# Patient Record
Sex: FEMALE | Race: WHITE | Employment: OTHER | URBAN - METROPOLITAN AREA
[De-identification: names, ages, dates, MRNs, and addresses within clinical notes are randomized per-mention and may not be internally consistent; named-entity substitution may affect disease eponyms.]

---

## 2017-04-27 ENCOUNTER — OFFICE VISIT (OUTPATIENT)
Dept: INTERNAL MEDICINE CLINIC | Age: 67
End: 2017-04-27

## 2017-04-27 VITALS
OXYGEN SATURATION: 97 % | HEART RATE: 67 BPM | TEMPERATURE: 98.5 F | WEIGHT: 139 LBS | SYSTOLIC BLOOD PRESSURE: 124 MMHG | RESPIRATION RATE: 12 BRPM | HEIGHT: 65 IN | BODY MASS INDEX: 23.16 KG/M2 | DIASTOLIC BLOOD PRESSURE: 76 MMHG

## 2017-04-27 DIAGNOSIS — H11.32 SUBCONJUNCTIVAL HEMORRHAGE OF LEFT EYE: Primary | ICD-10-CM

## 2017-04-27 NOTE — PATIENT INSTRUCTIONS
Subconjunctival Hemorrhage: Care Instructions  Your Care Instructions    Sometimes small blood vessels in the white of the eye can break, causing a red spot or speck. This is called a subconjunctival hemorrhage. The blood vessels may break when you sneeze, cough, vomit, strain, or bend over. Sometimes there is no clear cause. The blood may look alarming, especially if the spot is large. If there is no pain or vision change, there is usually no reason to worry, and the blood slowly will go away on its own in 2 to 3 weeks. Follow-up care is a key part of your treatment and safety. Be sure to make and go to all appointments, and call your doctor if you are having problems. Its also a good idea to know your test results and keep a list of the medicines you take. How can you care for yourself at home? · Watch for changes in your eye. It is normal for the red spot on your eyeball to change color as it heals. Just like a bruise on your skin, it may change from red to brown to purple to yellow. · Do not take aspirin or products that contain aspirin, which can increase bleeding. Use acetaminophen (Tylenol) if you need pain relief for another problem. · Do not take two or more pain medicines at the same time unless the doctor told you to. Many pain medicines have acetaminophen, which is Tylenol. Too much acetaminophen (Tylenol) can be harmful. When should you call for help? Call your doctor now or seek immediate medical care if:  · You see blood over the black part of your eye (pupil). · You have any changes or problems in your vision. · You have any pain in your eye. · You have any discharge from your eye. Watch closely for changes in your health, and be sure to contact your doctor if:  · Your eye color is not steadily returning to normal.  · The blood has not gone away after 2 to 3 weeks. · You develop bruising or bleeding elsewhere, such as the gums or the skin, or you have nosebleeds.   Where can you learn more? Go to http://samanta-andrew.info/. Enter V411 in the search box to learn more about \"Subconjunctival Hemorrhage: Care Instructions. \"  Current as of: May 23, 2016  Content Version: 11.2  © 8199-6891 cooala - your brands, [employer]. Care instructions adapted under license by [employer] (which disclaims liability or warranty for this information). If you have questions about a medical condition or this instruction, always ask your healthcare professional. [place] [address] any warranty or liability for your use of this information.

## 2017-04-27 NOTE — MR AVS SNAPSHOT
Visit Information Date & Time Provider Department Dept. Phone Encounter #  
 4/27/2017  9:00 AM Orlin Leal, [facility] [phone] [id number] Follow-up Instructions Return if symptoms worsen or fail to improve. Upcoming Health Maintenance Date Due Hepatitis C Screening 1950 DTaP/Tdap/Td series (1 - Tdap) 4/6/2007 PAP AKA CERVICAL CYTOLOGY 3/31/2017 GLAUCOMA SCREENING Q2Y 4/24/2017 Pneumococcal 65+ Low/Medium Risk (1 of 2 - PCV13) 4/24/2017 MEDICARE YEARLY EXAM 4/24/2017 BREAST CANCER SCRN MAMMOGRAM 8/2/2018 COLONOSCOPY 5/13/2021 Allergies as of 4/27/2017  Review Complete On: 4/27/2017 By: Orlin Leal MD  
  
 Severity Noted Reaction Type Reactions Benzocaine  02/16/2010    Rash Current Immunizations  Reviewed on 4/27/2017 Name Date Influenza Vaccine 11/1/2016 TD Vaccine 4/5/2007 Zoster Vaccine, Live 11/21/2014 Reviewed by Sumaya Hardin RN on 4/27/2017 at  9:02 AM  
You Were Diagnosed With   
  
 Codes Comments Subconjunctival hemorrhage of left eye    -  Primary ICD-10-CM: H11.32 
ICD-9-CM: 372.72 Vitals BP Pulse Temp Resp Height(growth percentile) Weight(growth percentile) 124/76 67 98.5 °F (36.9 °C) (Oral) 12 5' 5.2\" (1.656 m) 139 lb (63 kg) SpO2 BMI OB Status Smoking Status 97% 22.99 kg/m2 Postmenopausal Former Smoker BMI and BSA Data Body Mass Index Body Surface Area  
 22.99 kg/m 2 1.7 m 2 Preferred Pharmacy Pharmacy Name Phone [facility] [address], [address] [phone] Your Updated Medication List  
  
   
This list is accurate as of: 4/27/17  9:18 AM.  Always use your most recent med list.  
  
  
  
  
 aspirin delayed-release 81 mg tablet Take  by mouth daily. B COMPLETE PO Take  by mouth. CALCIUM 600 + D PO Take 2 Tabs by mouth daily. citalopram 20 mg tablet Commonly known as:  CELEXA  
TAKE TWO TABLETS BY MOUTH DAILY CLARITIN 10 mg tablet Generic drug:  loratadine Take 10 mg by mouth every morning. denosumab 60 mg/mL injection Commonly known as:  [place] Gault 60 mg by SubCUTAneous route. fluticasone 50 mcg/actuation nasal spray Commonly known as:  Kamilla Rocker 2 Sprays by Both Nostrils route once. RESTASIS 0.05 % ophthalmic emulsion Generic drug:  cycloSPORINE  
  
 THERAPEUTIC MULTIVIT/MINERAL 27-0.4 mg Tab Generic drug:  multivitamin,tx-iron-ca-min Take 1 Tab by mouth daily. VITAMIN D3 1,000 unit tablet Generic drug:  cholecalciferol Take  by mouth daily. Follow-up Instructions Return if symptoms worsen or fail to improve. Patient Instructions Subconjunctival Hemorrhage: Care Instructions Your Care Instructions Sometimes small blood vessels in the white of the eye can break, causing a red spot or speck. This is called a subconjunctival hemorrhage. The blood vessels may break when you sneeze, cough, vomit, strain, or bend over. Sometimes there is no clear cause. The blood may look alarming, especially if the spot is large. If there is no pain or vision change, there is usually no reason to worry, and the blood slowly will go away on its own in 2 to 3 weeks. Follow-up care is a key part of your treatment and safety. Be sure to make and go to all appointments, and call your doctor if you are having problems. Its also a good idea to know your test results and keep a list of the medicines you take. How can you care for yourself at home? · Watch for changes in your eye. It is normal for the red spot on your eyeball to change color as it heals. Just like a bruise on your skin, it may change from red to brown to purple to yellow. · Do not take aspirin or products that contain aspirin, which can increase bleeding. Use acetaminophen (Tylenol) if you need pain relief for another problem. · Do not take two or more pain medicines at the same time unless the doctor told you to. Many pain medicines have acetaminophen, which is Tylenol. Too much acetaminophen (Tylenol) can be harmful. When should you call for help? Call your doctor now or seek immediate medical care if: 
· You see blood over the black part of your eye (pupil). · You have any changes or problems in your vision. · You have any pain in your eye. · You have any discharge from your eye. Watch closely for changes in your health, and be sure to contact your doctor if: 
· Your eye color is not steadily returning to normal. 
· The blood has not gone away after 2 to 3 weeks. · You develop bruising or bleeding elsewhere, such as the gums or the skin, or you have nosebleeds. Where can you learn more? Go to http://samanta-andrew.info/. Enter W693 in the search box to learn more about \"Subconjunctival Hemorrhage: Care Instructions. \" Current as of: May 23, 2016 Content Version: 11.2 © 0684-0102 [employer]. Care instructions adapted under license by [employer] (which disclaims liability or warranty for this information). If you have questions about a medical condition or this instruction, always ask your healthcare professional. [address] any warranty or liability for your use of this information. Introducing [facility]! Azucena Dominguez introduces rPath patient portal. Now you can access parts of your medical record, email your doctor's office, and request medication refills online. 1. In your internet browser, go to [email or web address] 2. Click on the First Time User? Click Here link in the Sign In box. You will see the New Member Sign Up page. 3. Enter your rPath Access Code exactly as it appears below. You will not need to use this code after youve completed the sign-up process.  If you do not sign up before the expiration date, you must request a new code. · [employer] Access Code: [email or web address] Expires: 7/26/2017  8:55 AM 
 
4. Enter the last four digits of your Social Security Number (xxxx) and Date of Birth (mm/dd/yyyy) as indicated and click Submit. You will be taken to the next sign-up page. 5. Create a Or[employer] ID. This will be your [employer] login ID and cannot be changed, so think of one that is secure and easy to remember. 6. Create a [employer] password. You can change your password at any time. 7. Enter your Password Reset Question and Answer. This can be used at a later time if you forget your password. 8. Enter your e-mail address. You will receive e-mail notification when new information is available in [address] 9. Click Sign Up. You can now view and download portions of your medical record. 10. Click the Download Summary menu link to download a portable copy of your medical information. If you have questions, please visit the Frequently Asked Questions section of the [employer] website. Remember, [employer] is NOT to be used for urgent needs. For medical emergencies, dial 911. Now available from your iPhone and Android! Please provide this summary of care documentation to your next provider. Your primary care clinician is listed as SHITAL GATES. If you have any questions after today's visit, please call [phone].

## 2017-04-27 NOTE — PROGRESS NOTES
Fantasma Keyes is a 74 y.o. female who was seen in clinic today (4/27/2017). Assessment & Plan:  Reece Islas was seen today for red eye. Diagnoses and all orders for this visit:    Subconjunctival hemorrhage of left eye- new dx to me, chronic issue, I spent 15 minutes with the patient and >50% of the time was spent counseling on etiologies, treatment options, and red flags. Will cont w/ eye drops and will stop ASA & MVI. Reviewed likely due to these meds. If no changes needs to see eye specialist & consider labs (CBC and INR). Did review if resolves since has been recurrent would not recommend restarting ASA but can talk to PCP. Red flags and expectations were reviewed & discussed with the her. She verbalized understanding. Follow-up Disposition:  Return if symptoms worsen or fail to improve.       ----------------------------------------------------------------------    Subjective:  Patient presents for presents evaluation of hemorrhage. She has noticed the above symptoms in the left eye for 4 weeks. Onset was sudden. Symptoms have included erythema. Patient denies pain, blurred vision, foreign body sensation, visual field deficit, discharge, photophobia. There is a history of this in the past.  Has worn contact lenses. No h/o trauma. Is using Restasis as directed. Had been taking aspirin 81mg up until today. Prior to Admission medications    Medication Sig Start Date End Date Taking? Authorizing Provider   citalopram (CELEXA) 20 mg tablet TAKE TWO TABLETS BY MOUTH DAILY 12/27/16  Yes Jena Elder MD   fluticasone (FLONASE) 50 mcg/actuation nasal spray 2 Sprays by Both Nostrils route once. Yes Historical Provider   denosumab (PROLIA) 60 mg/mL injection 60 mg by SubCUTAneous route. Yes Historical Provider   aspirin delayed-release 81 mg tablet Take  by mouth daily.    Yes Historical Provider   RESTASIS 0.05 % ophthalmic emulsion  8/3/11  Yes Historical Provider   VITAMIN B COMPLEX (B COMPLETE PO) Take  by mouth. Yes Historical Provider   loratadine (CLARITIN) 10 mg tablet Take 10 mg by mouth every morning. 2/16/10  Yes Historical Provider   multivitamin,tx-iron-ca-min (THERAPEUTIC MULTIVIT/MINERAL) 27-0.4 mg Tab Take 1 Tab by mouth daily. Yes Historical Provider   cholecalciferol, vitamin d3, (VITAMIN D) 1,000 unit tablet Take  by mouth daily. Yes Historical Provider   CALCIUM CARBONATE/VITAMIN D3 (CALCIUM 600 + D PO) Take 2 Tabs by mouth daily. 2/16/10  Yes Historical Provider          Allergies   Allergen Reactions    Benzocaine Rash           Review of Systems   HENT: Negative for nosebleeds. Respiratory: Negative for hemoptysis. Gastrointestinal: Negative for blood in stool and melena. Genitourinary: Negative for hematuria. Endo/Heme/Allergies: Does not bruise/bleed easily. Objective:   Physical Exam   Eyes: EOM are normal. Pupils are equal, round, and reactive to light. Right conjunctiva is not injected. Right conjunctiva has no hemorrhage. Left conjunctiva is not injected. Left conjunctiva has a hemorrhage. Cardiovascular: Regular rhythm and normal heart sounds. No murmur heard. Pulmonary/Chest: Effort normal and breath sounds normal. She has no wheezes. She has no rales. Visit Vitals    /76    Pulse 67    Temp 98.5 °F (36.9 °C) (Oral)    Resp 12    Ht 5' 5.2\" (1.656 m)    Wt 139 lb (63 kg)    SpO2 97%    BMI 22.99 kg/m2         Disclaimer:  Advised her to call back or return to office if symptoms worsen/change/persist.  Discussed expected course/resolution/complications of diagnosis in detail with patient. Medication risks/benefits/costs/interactions/alternatives discussed with patient. She was given an after visit summary which includes diagnoses, current medications, & vitals. She expressed understanding with the diagnosis and plan.         Puja Martinez MD

## 2017-06-05 LAB — CREATININE, EXTERNAL: 0.8

## 2017-06-27 DIAGNOSIS — F34.1 DYSTHYMIC DISORDER: ICD-10-CM

## 2017-06-27 RX ORDER — CITALOPRAM 20 MG/1
TABLET, FILM COATED ORAL
Qty: 60 TAB | Refills: 4 | Status: SHIPPED | OUTPATIENT
Start: 2017-06-27 | End: 2017-11-15 | Stop reason: SDUPTHER

## 2017-06-27 NOTE — TELEPHONE ENCOUNTER
Orders Placed This Encounter    citalopram (CELEXA) 20 mg tablet     Sig: TAKE TWO TABLETS BY MOUTH DAILY     Dispense:  60 Tab     Refill:  4     The above medication refills were approved via verbal order by Dr. Monse Almazan.

## 2017-11-15 ENCOUNTER — OFFICE VISIT (OUTPATIENT)
Dept: INTERNAL MEDICINE CLINIC | Age: 67
End: 2017-11-15

## 2017-11-15 VITALS
TEMPERATURE: 98.8 F | HEIGHT: 65 IN | HEART RATE: 67 BPM | BODY MASS INDEX: 23.82 KG/M2 | WEIGHT: 143 LBS | DIASTOLIC BLOOD PRESSURE: 82 MMHG | RESPIRATION RATE: 16 BRPM | SYSTOLIC BLOOD PRESSURE: 145 MMHG

## 2017-11-15 DIAGNOSIS — F34.1 DYSTHYMIC DISORDER: ICD-10-CM

## 2017-11-15 DIAGNOSIS — J01.40 ACUTE NON-RECURRENT PANSINUSITIS: Primary | ICD-10-CM

## 2017-11-15 RX ORDER — CITALOPRAM 20 MG/1
TABLET, FILM COATED ORAL
Qty: 180 TAB | Refills: 6 | Status: SHIPPED | OUTPATIENT
Start: 2017-11-15 | End: 2018-12-02 | Stop reason: SDUPTHER

## 2017-11-15 RX ORDER — AMOXICILLIN AND CLAVULANATE POTASSIUM 875; 125 MG/1; MG/1
1 TABLET, FILM COATED ORAL EVERY 12 HOURS
Qty: 20 TAB | Refills: 0 | Status: SHIPPED | OUTPATIENT
Start: 2017-11-15 | End: 2017-11-25

## 2017-11-15 NOTE — PROGRESS NOTES
HISTORY OF PRESENT ILLNESS  Belinda Manley is a 74 y.o. female. URI    The history is provided by the patient. This is a new problem. Episode onset: 8 d. The problem has not changed since onset. There has been no fever. Associated symptoms include congestion, sore throat and cough. Pertinent negatives include no abdominal pain, no diarrhea, no nausea and no vomiting. Associated symptoms comments: Colored discharge. Treatments tried: routine, ibuprofen, mucinex. The treatment provided mild relief. Review of Systems   HENT: Positive for congestion and sore throat. Respiratory: Positive for cough and sputum production. Gastrointestinal: Negative for abdominal pain, diarrhea, nausea and vomiting. Physical Exam   Constitutional: No distress. HENT:   Right Ear: Tympanic membrane and ear canal normal.   Left Ear: Tympanic membrane and ear canal normal.   Nose: Right sinus exhibits no maxillary sinus tenderness and no frontal sinus tenderness. Left sinus exhibits no maxillary sinus tenderness and no frontal sinus tenderness. Mouth/Throat: Posterior oropharyngeal edema and posterior oropharyngeal erythema present. No oropharyngeal exudate. Eyes: Conjunctivae are normal. Right eye exhibits no discharge. Left eye exhibits no discharge. Neck: Neck supple. Cardiovascular: Normal rate and regular rhythm. Exam reveals no gallop and no friction rub. No murmur heard. Pulmonary/Chest: Effort normal and breath sounds normal.   Lymphadenopathy:     She has no cervical adenopathy. Nursing note and vitals reviewed. ASSESSMENT and PLAN  Diagnoses and all orders for this visit:    1. Acute non-recurrent pansinusitis  -     AMB POC RAPID STREP A- neg  -     amoxicillin-clavulanate (AUGMENTIN) 875-125 mg per tablet; Take 1 Tab by mouth every twelve (12) hours for 10 days.     2. Dysthymic disorder  -     citalopram (CELEXA) 20 mg tablet; TAKE TWO TABLETS BY MOUTH DAILY

## 2017-11-15 NOTE — MR AVS SNAPSHOT
Visit Information Date & Time Provider Department Dept. Phone Encounter #  
 11/15/2017 11:30 AM Dedrick Cobb, [address] [facility] [phone] [id number] Follow-up Instructions Return in about 3 months (around 2/15/2018) for cpe. Upcoming Health Maintenance Date Due Hepatitis C Screening 1950 DTaP/Tdap/Td series (1 - Tdap) 4/6/2007 Pneumococcal 65+ Low/Medium Risk (1 of 2 - PCV13) 4/24/2017 MEDICARE YEARLY EXAM 4/24/2017 GLAUCOMA SCREENING Q2Y 3/27/2019 BREAST CANCER SCRN MAMMOGRAM 9/26/2019 COLONOSCOPY 5/13/2021 Allergies as of 11/15/2017  Review Complete On: 11/15/2017 By: Dedrick Cobb MD  
  
 Severity Noted Reaction Type Reactions Benzocaine  02/16/2010    Rash  
 topical  
  
Current Immunizations  Reviewed on 4/27/2017 Name Date Influenza Vaccine 11/1/2016 TD Vaccine 4/5/2007 Zoster Vaccine, Live 11/21/2014 Not reviewed this visit You Were Diagnosed With   
  
 Codes Comments Acute non-recurrent pansinusitis    -  Primary ICD-10-CM: J01.40 ICD-9-CM: 461.8 Dysthymic disorder     ICD-10-CM: F34.1 ICD-9-CM: 300.4 Vitals BP Pulse Temp Resp Height(growth percentile) Weight(growth percentile) 145/82 (BP 1 Location: Right arm, BP Patient Position: Sitting) 67 98.8 °F (37.1 °C) (Oral) 16 5' 5.2\" (1.656 m) 143 lb (64.9 kg) BMI OB Status Smoking Status 23.65 kg/m2 Postmenopausal Former Smoker BMI and BSA Data Body Mass Index Body Surface Area  
 23.65 kg/m 2 1.73 m 2 Preferred Pharmacy Pharmacy Name Phone Savana Moraes [address] [facility], [address] [phone] Your Updated Medication List  
  
   
This list is accurate as of: 11/15/17 12:43 PM.  Always use your most recent med list.  
  
  
  
  
 amoxicillin-clavulanate 875-125 mg per tablet Commonly known as:  AUGMENTIN  
 Take 1 Tab by mouth every twelve (12) hours for 10 days. B COMPLETE PO Take  by mouth. CALCIUM 600 + D PO Take 2 Tabs by mouth daily. citalopram 20 mg tablet Commonly known as:  CELEXA  
TAKE TWO TABLETS BY MOUTH DAILY CLARITIN 10 mg tablet Generic drug:  loratadine Take 10 mg by mouth every morning. denosumab 60 mg/mL injection Commonly known as:  Romney Sportsman 60 mg by SubCUTAneous route. fluticasone 50 mcg/actuation nasal spray Commonly known as:  Lindsay Finely 2 Sprays by Both Nostrils route once. MUCINEX PO Take  by mouth. THERAPEUTIC MULTIVIT/MINERAL 27-0.4 mg Tab Generic drug:  multivitamin,tx-iron-ca-min Take 1 Tab by mouth daily. VITAMIN D3 1,000 unit tablet Generic drug:  cholecalciferol Take  by mouth daily. Prescriptions Sent to Pharmacy Refills  
 citalopram (CELEXA) 20 mg tablet 6 Sig: TAKE TWO TABLETS BY MOUTH DAILY Class: Normal  
 Pharmacy: [address] Ph #: [phone]  
 amoxicillin-clavulanate (AUGMENTIN) 875-125 mg per tablet 0 Sig: Take 1 Tab by mouth every twelve (12) hours for 10 days. Class: Normal  
 Pharmacy: [facility] [address] [place] [address], [address] [place] [address] Ph #: [phone] Route: Oral  
  
We Performed the Following AMB POC RAPID STREP A [29135 CPT(R)] Follow-up Instructions Return in about 3 months (around 2/15/2018) for cpe. Introducing [facility]! [facility] introduces Autobook Now patient portal. Now you can access parts of your medical record, email your doctor's office, and request medication refills online. 1. In your internet browser, go to [email or web address]/Avan[employer]i Bio 2. Click on the First Time User? Click Here link in the Sign In box. You will see the New Member Sign Up page. 3. Enter your Autobook Now Access Code exactly as it appears below.  You will not need to use this code after youve completed the sign-up process. If you do not sign up before the expiration date, you must request a new code. · [employer] Access Code: [email or web address] Expires: 2/13/2018 12:43 PM 
 
4. Enter the last four digits of your Social Security Number (xxxx) and Date of Birth (mm/dd/yyyy) as indicated and click Submit. You will be taken to the next sign-up page. 5. Create a Music Factory ID. This will be your [employer] login ID and cannot be changed, so think of one that is secure and easy to remember. 6. Create a [employer] password. You can change your password at any time. 7. Enter your Password Reset Question and Answer. This can be used at a later time if you forget your password. 8. Enter your e-mail address. You will receive e-mail notification when new information is available in [address] 9. Click Sign Up. You can now view and download portions of your medical record. 10. Click the Download Summary menu link to download a portable copy of your medical information. If you have questions, please visit the Frequently Asked Questions section of the [employer] website. Remember, [employer] is NOT to be used for urgent needs. For medical emergencies, dial 911. Now available from your iPhone and Android! Please provide this summary of care documentation to your next provider. Your primary care clinician is listed as SHITAL GATES. If you have any questions after today's visit, please call [phone].

## 2017-11-16 LAB
S PYO AG THROAT QL: NEGATIVE
VALID INTERNAL CONTROL?: YES

## 2017-12-12 ENCOUNTER — OFFICE VISIT (OUTPATIENT)
Dept: INTERNAL MEDICINE CLINIC | Age: 67
End: 2017-12-12

## 2017-12-12 VITALS
HEIGHT: 65 IN | RESPIRATION RATE: 16 BRPM | DIASTOLIC BLOOD PRESSURE: 80 MMHG | HEART RATE: 71 BPM | WEIGHT: 138 LBS | TEMPERATURE: 98.5 F | BODY MASS INDEX: 22.99 KG/M2 | SYSTOLIC BLOOD PRESSURE: 126 MMHG | OXYGEN SATURATION: 98 %

## 2017-12-12 DIAGNOSIS — L72.3 SEBACEOUS CYST: Primary | ICD-10-CM

## 2017-12-12 NOTE — PATIENT INSTRUCTIONS
Epidermoid Cyst: Care Instructions  Your Care Instructions  An epidermoid (say \"sl-fyz-FOV-aurelia\") cyst is a lump just under the skin. These cysts can form when a hair follicle becomes blocked. They are common in acne and may occur on the face, neck, back, and genitals. However, they can form anywhere on the body. These cysts are not cancer and do not lead to cancer. They tend not to hurt, but they can sometimes become swollen and painful. They also may break open (rupture) and cause scarring. These cysts sometimes do not cause problems and may not need treatment. If you have a cyst that is swollen and hurts, your doctor may inject it with a medicine to help it heal. But it is more likely that a painful cyst will need to be removed. Your doctor will give you a shot of numbing medicine and cut into the cyst to drain it or remove it. This makes the symptoms go away. Follow-up care is a key part of your treatment and safety. Be sure to make and go to all appointments, and call your doctor if you are having problems. It's also a good idea to know your test results and keep a list of the medicines you take. How can you care for yourself at home? · Do not squeeze the cyst or poke it with a needle to open it. This can cause swelling, redness, and infection. · Always have a doctor look at any new lumps you get to make sure that they are not serious. When should you call for help? Watch closely for changes in your health, and be sure to contact your doctor if:  ? · You have a fever, redness, or swelling after you get a shot of medicine in the cyst.   ? · You see or feel a new lump on your skin. Where can you learn more? Go to http://samanta-andrew.info/. Enter Z760 in the search box to learn more about \"Epidermoid Cyst: Care Instructions. \"  Current as of: October 13, 2016  Content Version: 11.4  © 9886-5972 [employer].  Care instructions adapted under license by [employer] (which disclaims liability or warranty for this information). If you have questions about a medical condition or this instruction, always ask your healthcare professional. [address] any warranty or liability for your use of this information.

## 2017-12-12 NOTE — PROGRESS NOTES
HISTORY OF PRESENT ILLNESS  Kriss Mchugh is a 74 y.o. female. Cyst   The history is provided by the patient (noted sq nodule posterior neck). This is a new problem. Episode onset: noted one wk  The problem occurs constantly. The problem has not changed since onset. Exacerbated by: pressure, palpation. She has tried nothing for the symptoms. Review of Systems   Constitutional: Negative for chills, fever and malaise/fatigue. Physical Exam   Constitutional: No distress. HENT:   Head:       Nursing note and vitals reviewed. ASSESSMENT and PLAN  Diagnoses and all orders for this visit:    1.  Sebaceous cyst  -     REFERRAL TO PLASTIC SURGERY, warned re s/s infection

## 2017-12-12 NOTE — MR AVS SNAPSHOT
Visit Information Date & Time Provider Department Dept. Phone Encounter #  
 12/12/2017  1:00 PM Charity Chandler, [address] [facility] [phone] [id number] Follow-up Instructions Return if symptoms worsen or fail to improve. Your Appointments 2/7/2018  2:00 PM  
Medicare Physical with Charity Chandler MD  
[facility] [address]) Appt Note: a  
 [address] [facility] [address] [place] [address]  
[address] [place] [address] Upcoming Health Maintenance Date Due Hepatitis C Screening 1950 DTaP/Tdap/Td series (1 - Tdap) 4/6/2007 Pneumococcal 65+ Low/Medium Risk (1 of 2 - PCV13) 4/24/2017 MEDICARE YEARLY EXAM 4/24/2017 GLAUCOMA SCREENING Q2Y 3/27/2019 COLONOSCOPY 5/13/2021 Allergies as of 12/12/2017  Review Complete On: 12/12/2017 By: Charity Chandler MD  
  
 Severity Noted Reaction Type Reactions Benzocaine  02/16/2010    Rash  
 topical  
  
Current Immunizations  Reviewed on 4/27/2017 Name Date Influenza Vaccine 11/1/2016 TD Vaccine 4/5/2007 Zoster Vaccine, Live 11/21/2014 Not reviewed this visit You Were Diagnosed With   
  
 Codes Comments Sebaceous cyst    -  Primary ICD-10-CM: L72.3 ICD-9-CM: 706. 2 Vitals BP Pulse Temp Resp Height(growth percentile) Weight(growth percentile) 126/80 (BP 1 Location: Right arm, BP Patient Position: Sitting) 71 98.5 °F (36.9 °C) (Oral) 16 5' 5.2\" (1.656 m) 138 lb (62.6 kg) SpO2 BMI OB Status Smoking Status 98% 22.82 kg/m2 Postmenopausal Former Smoker BMI and BSA Data Body Mass Index Body Surface Area  
 22.82 kg/m 2 1.7 m 2 Preferred Pharmacy Pharmacy Name Phone  [facility] [address], [address] [phone] Your Updated Medication List  
  
   
 This list is accurate as of: 12/12/17  1:33 PM.  Always use your most recent med list.  
  
  
  
  
 B COMPLETE PO Take  by mouth. CALCIUM 600 + D PO Take 2 Tabs by mouth daily. citalopram 20 mg tablet Commonly known as:  CELEXA  
TAKE TWO TABLETS BY MOUTH DAILY CLARITIN 10 mg tablet Generic drug:  loratadine Take 10 mg by mouth every morning. denosumab 60 mg/mL injection Commonly known as:  Yvonna Dave 60 mg by SubCUTAneous route. fluticasone 50 mcg/actuation nasal spray Commonly known as:  Caffie Euler 2 Sprays by Both Nostrils route once. THERAPEUTIC MULTIVIT/MINERAL 27-0.4 mg Tab Generic drug:  multivitamin,tx-iron-ca-min Take 1 Tab by mouth daily. VITAMIN D3 1,000 unit tablet Generic drug:  cholecalciferol Take  by mouth daily. We Performed the Following REFERRAL TO PLASTIC SURGERY [REF89 Custom] Follow-up Instructions Return if symptoms worsen or fail to improve. Referral Information Referral ID Referred By Referred To  
  
 [id number] Tom GATES MD   
   [address] [place] [address], [address] [place] [address] Phone: [phone] Fax: [phone] Visits Status Start Date End Date 1 New Request 12/12/17 12/12/18 If your referral has a status of pending review or denied, additional information will be sent to support the outcome of this decision. Patient Instructions Epidermoid Cyst: Care Instructions Your Care Instructions An epidermoid (say \"us-zrw-KMW-moyd\") cyst is a lump just under the skin. These cysts can form when a hair follicle becomes blocked. They are common in acne and may occur on the face, neck, back, and genitals. However, they can form anywhere on the body. These cysts are not cancer and do not lead to cancer. They tend not to hurt, but they can sometimes become swollen and painful. They also may break open (rupture) and cause scarring. These cysts sometimes do not cause problems and may not need treatment. If you have a cyst that is swollen and hurts, your doctor may inject it with a medicine to help it heal. But it is more likely that a painful cyst will need to be removed. Your doctor will give you a shot of numbing medicine and cut into the cyst to drain it or remove it. This makes the symptoms go away. Follow-up care is a key part of your treatment and safety. Be sure to make and go to all appointments, and call your doctor if you are having problems. It's also a good idea to know your test results and keep a list of the medicines you take. How can you care for yourself at home? · Do not squeeze the cyst or poke it with a needle to open it. This can cause swelling, redness, and infection. · Always have a doctor look at any new lumps you get to make sure that they are not serious. When should you call for help? Watch closely for changes in your health, and be sure to contact your doctor if: 
? · You have a fever, redness, or swelling after you get a shot of medicine in the cyst.  
? · You see or feel a new lump on your skin. Where can you learn more? Go to http://samanta-andrew.info/. Enter K148 in the search box to learn more about \"Epidermoid Cyst: Care Instructions. \" Current as of: October 13, 2016 Content Version: 11.4 © 3417-4837 FastFig. Care instructions adapted under license by [employer] (which disclaims liability or warranty for this information). If you have questions about a medical condition or this instruction, always ask your healthcare professional. [address] any warranty or liability for your use of this information. Introducing [facility] & HEALTH SERVICES! Efra Kaur introduces [employer] patient portal. Now you can access parts of your medical record, email your doctor's office, and request medication refills online. 1. In your internet browser, go to [email or web address] 2. Click on the First Time User? Click Here link in the Sign In box. You will see the New Member Sign Up page. 3. Enter your GenoLogics Access Code exactly as it appears below. You will not need to use this code after youve completed the sign-up process. If you do not sign up before the expiration date, you must request a new code. · GenoLogics Access Code: [email or web address] Expires: 2/13/2018 12:43 PM 
 
4. Enter the last four digits of your Social Security Number (xxxx) and Date of Birth (mm/dd/yyyy) as indicated and click Submit. You will be taken to the next sign-up page. 5. Create a [employer] ID. This will be your GenoLogics login ID and cannot be changed, so think of one that is secure and easy to remember. 6. Create a GenoLogics password. You can change your password at any time. 7. Enter your Password Reset Question and Answer. This can be used at a later time if you forget your password. 8. Enter your e-mail address. You will receive e-mail notification when new information is available in [address] 9. Click Sign Up. You can now view and download portions of your medical record. 10. Click the Download Summary menu link to download a portable copy of your medical information. If you have questions, please visit the Frequently Asked Questions section of the GenoLogics website. Remember, GenoLogics is NOT to be used for urgent needs. For medical emergencies, dial 911. Now available from your iPhone and Android! Please provide this summary of care documentation to your next provider. Your primary care clinician is listed as SHITAL GATES. If you have any questions after today's visit, please call [phone].

## 2017-12-13 LAB — CREATININE, EXTERNAL: 0.7

## 2018-02-07 ENCOUNTER — OFFICE VISIT (OUTPATIENT)
Dept: INTERNAL MEDICINE CLINIC | Age: 68
End: 2018-02-07

## 2018-02-07 VITALS
HEIGHT: 65 IN | HEART RATE: 71 BPM | DIASTOLIC BLOOD PRESSURE: 76 MMHG | SYSTOLIC BLOOD PRESSURE: 129 MMHG | TEMPERATURE: 98.2 F | RESPIRATION RATE: 18 BRPM | OXYGEN SATURATION: 95 % | WEIGHT: 136.4 LBS | BODY MASS INDEX: 22.73 KG/M2

## 2018-02-07 DIAGNOSIS — M94.9 DISORDER OF BONE AND CARTILAGE: ICD-10-CM

## 2018-02-07 DIAGNOSIS — Z23 ENCOUNTER FOR IMMUNIZATION: ICD-10-CM

## 2018-02-07 DIAGNOSIS — Z13.31 SCREENING FOR DEPRESSION: ICD-10-CM

## 2018-02-07 DIAGNOSIS — E78.00 ELEVATED LDL CHOLESTEROL LEVEL: ICD-10-CM

## 2018-02-07 DIAGNOSIS — Z71.89 ADVANCE DIRECTIVE DISCUSSED WITH PATIENT: ICD-10-CM

## 2018-02-07 DIAGNOSIS — F34.1 DYSTHYMIC DISORDER: ICD-10-CM

## 2018-02-07 DIAGNOSIS — F32.A MILD DEPRESSION: ICD-10-CM

## 2018-02-07 DIAGNOSIS — Z00.00 WELCOME TO MEDICARE PREVENTIVE VISIT: Primary | ICD-10-CM

## 2018-02-07 DIAGNOSIS — Z11.59 NEED FOR HEPATITIS C SCREENING TEST: ICD-10-CM

## 2018-02-07 DIAGNOSIS — Z23 NEED FOR TDAP VACCINATION: ICD-10-CM

## 2018-02-07 DIAGNOSIS — M89.9 DISORDER OF BONE AND CARTILAGE: ICD-10-CM

## 2018-02-07 NOTE — MR AVS SNAPSHOT
[address] [place] [address] 
[phone] Patient: Krystina Kee MRN:  [id number] Visit Information Date & Time Provider Department Dept. Phone Encounter #  
 2/7/2018  2:00 PM Mis Abernathy, [address] [facility] [phone] [id number] Follow-up Instructions Return in about 1 year (around 2/7/2019), or if symptoms worsen or fail to improve. Upcoming Health Maintenance Date Due Hepatitis C Screening 1950 DTaP/Tdap/Td series (1 - Tdap) 4/6/2007 Pneumococcal 65+ Low/Medium Risk (1 of 2 - PCV13) 4/24/2017 MEDICARE YEARLY EXAM 2/8/2019 GLAUCOMA SCREENING Q2Y 3/27/2019 BREAST CANCER SCRN MAMMOGRAM 9/26/2019 COLONOSCOPY 5/13/2021 Allergies as of 2/7/2018  Review Complete On: 2/7/2018 By: Mis Abernathy MD  
  
 Severity Noted Reaction Type Reactions Benzocaine  02/16/2010    Rash  
 topical  
  
Current Immunizations  Reviewed on 4/27/2017 Name Date Influenza Vaccine 11/1/2016 TD Vaccine 4/5/2007 Zoster Vaccine, Live 11/21/2014 Not reviewed this visit You Were Diagnosed With   
  
 Codes Comments Welcome to Medicare preventive visit    -  Primary ICD-10-CM: Z00.00 ICD-9-CM: V70.0 Need for Tdap vaccination     ICD-10-CM: W79 ICD-9-CM: V06.1 Encounter for immunization     ICD-10-CM: S64 ICD-9-CM: V03.89 Need for hepatitis C screening test     ICD-10-CM: Z11.59 
ICD-9-CM: V73.89 Advance directive discussed with patient     ICD-10-CM: Z71.89 ICD-9-CM: V65.49 Screening for depression     ICD-10-CM: Z13.89 ICD-9-CM: V79.0 Dysthymic disorder     ICD-10-CM: F34.1 ICD-9-CM: 300.4 Elevated LDL cholesterol level     ICD-10-CM: E78.00 ICD-9-CM: 272.0 Disorder of bone and cartilage     ICD-10-CM: M89.9, M94.9 ICD-9-CM: 733.90 Vitals BP Pulse Temp Resp Height(growth percentile) Weight(growth percentile) 129/76 (BP 1 Location: Right arm, BP Patient Position: Sitting) 71 98.2 °F (36.8 °C) (Oral) 18 5' 5\" (1.651 m) 136 lb 6.4 oz (61.9 kg) SpO2 BMI OB Status Smoking Status 95% 22.7 kg/m2 Postmenopausal Former Smoker BMI and BSA Data Body Mass Index Body Surface Area 22.7 kg/m 2 1.68 m 2 Preferred Pharmacy Pharmacy Name Phone [facility] [address] [place] [address], [address] [place] [address] [phone] Your Updated Medication List  
  
   
This list is accurate as of: 18  3:02 PM.  Always use your most recent med list.  
  
  
  
  
 B COMPLETE PO Take  by mouth. CALCIUM 600 + D PO Take 2 Tabs by mouth daily. citalopram 20 mg tablet Commonly known as:  CELEXA  
TAKE TWO TABLETS BY MOUTH DAILY CLARITIN 10 mg tablet Generic drug:  loratadine Take 10 mg by mouth every morning. denosumab 60 mg/mL injection Commonly known as:  Crystal Bundy 60 mg by SubCUTAneous route. diph,Pertuss(Acell),Tet Vac-PF 2 Lf-(2.5-5-3-5 mcg)-5Lf/0.5 mL susp Commonly known as:  ADACEL  
0.5 mL by IntraMUSCular route once for 1 dose. fluticasone 50 mcg/actuation nasal spray Commonly known as:  Monisha Huitron 2 Sprays by Both Nostrils route once. pneumococcal 13 christian conj dip 0.5 mL Syrg injection Commonly known as:  PREVNAR-13  
0.5 mL by IntraMUSCular route once for 1 dose. THERAPEUTIC MULTIVIT/MINERAL 27-0.4 mg Tab Generic drug:  multivitamin,tx-iron-ca-min Take 1 Tab by mouth daily. VITAMIN D3 1,000 unit tablet Generic drug:  cholecalciferol Take  by mouth daily. Prescriptions Printed Refills diph,Pertuss,Acell,,Tet Vac-PF (ADACEL) 2 Lf-(2.5-5-3-5 mcg)-5Lf/0.5 mL susp 0 Si.5 mL by IntraMUSCular route once for 1 dose. Class: Print  Route: IntraMUSCular  
 pneumococcal 13 christian conj dip (PREVNAR-13) 0.5 mL syrg injection 0  
 Si.5 mL by IntraMUSCular route once for 1 dose. Class: Print Route: IntraMUSCular We Performed the Following AMB POC EKG ROUTINE W/ 12 LEADS, SCREEN () [ [facility]] CBC WITH AUTOMATED DIFF [31048 CPT(R)] Christiannef 68 [[id number] [facility]S] HEPATITIS C AB [67485 CPT(R)] LIPID PANEL [46192 CPT(R)] METABOLIC PANEL, COMPREHENSIVE [65902 CPT(R)] TSH 3RD GENERATION [91887 CPT(R)] URINALYSIS W/ RFLX MICROSCOPIC [23154 CPT(R)] Follow-up Instructions Return in about 1 year (around 2019), or if symptoms worsen or fail to improve. Patient Instructions Medicare Wellness Visit, Female The best way to live healthy is to have a healthy lifestyle by eating a well-balanced diet, exercising regularly, limiting alcohol and stopping smoking. Regular physical exams and screening tests are another way to keep healthy. Preventive exams provided by your health care provider can find health problems before they become diseases or illnesses. Preventive services including immunizations, screening tests, monitoring and exams can help you take care of your own health. All people over age 72 should have a pneumovax  and and a prevnar shot to prevent pneumonia. These are once in a lifetime unless you and your provider decide differently. All people over 65 should have a yearly flu shot and a tetanus vaccine every 10 years. A bone mass density to screen for osteoporosis or thinning of the bones should be done every 2 years after 65. Screening for diabetes mellitus with a blood sugar test should be done every year. Glaucoma is a disease of the eye due to increased ocular pressure that can lead to blindness and it should be done every year by an eye professional. 
 
Cardiovascular screening tests that check for elevated lipids (fatty part of blood) which can lead to heart disease and strokes should be done every 5 years. Colorectal screening that evaluates for blood or polyps in your colon should be done yearly as a stool test or every five years as a flexible sigmoidoscope or every 10 years as a colonoscopy up to age 76. Breast cancer screening with a mammogram is recommended biennially  for women age 54-69. Screening for cervical cancer with a pap smear and pelvic exam is recommended for women after age 72 years every 2 years up to age 79 or when the provider and patient decide to stop. If there is a history of cervical abnormalities or other increased risk for cancer then the test is recommended yearly. Hepatitis C screening is also recommended for anyone born between [birth year] through [address]. A shingles vaccine is also recommended once in a lifetime after age 61. Your Medicare Wellness Exam is recommended annually. Here is a list of your current Health Maintenance items with a due date: 
Health Maintenance Due Topic Date Due  
 Hepatitis C Test  1950  DTaP/Tdap/Td  (1 - Tdap) 04/06/2007  Pneumococcal Vaccine (1 of 2 - PCV13) 04/24/2017 Introducing \[facility]\"" & [facility] SERVICES! [facility] introduces [employer] patient portal. Now you can access parts of your medical record, email your doctor's office, and request medication refills online. 1. In your internet browser, go to [email or web address] 2. Click on the First Time User? Click Here link in the Sign In box. You will see the New Member Sign Up page. 3. Enter your Scot[employer] Gear Access Code exactly as it appears below. You will not need to use this code after youve completed the sign-up process. If you do not sign up before the expiration date, you must request a new code. · [employer] Access Code: [email or web address] Expires: 2/13/2018 12:43 PM 
 
4. Enter the last four digits of your Social Security Number (xxxx) and Date of Birth (mm/dd/yyyy) as indicated and click Submit. You will be taken to the next sign-up page. 5. Create a Sien ID. This will be your Sien login ID and cannot be changed, so think of one that is secure and easy to remember. 6. Create a Sien password. You can change your password at any time. 7. Enter your Password Reset Question and Answer. This can be used at a later time if you forget your password. 8. Enter your e-mail address. You will receive e-mail notification when new information is available in [address] 9. Click Sign Up. You can now view and download portions of your medical record. 10. Click the Download Summary menu link to download a portable copy of your medical information. If you have questions, please visit the Frequently Asked Questions section of the Sien website. Remember, Sien is NOT to be used for urgent needs. For medical emergencies, dial 911. Now available from your iPhone and Android! Please provide this summary of care documentation to your next provider. Your primary care clinician is listed as SHITAL GATES. If you have any questions after today's visit, please call [phone].

## 2018-02-07 NOTE — PROGRESS NOTES
This is a \"Welcome to [employer]"  Initial Preventive Physical Examination (IPPE) providing Personalized Prevention Plan Services (Performed in the first 12 months of enrollment)    I have reviewed the patient's medical history in detail and updated the computerized patient record. History   History reviewed. No pertinent past medical history. Past Surgical History:   Procedure Laterality Date    ENDOSCOPY, COLON, DIAGNOSTIC      11, due 16     Current Outpatient Prescriptions   Medication Sig Dispense Refill    citalopram (CELEXA) 20 mg tablet TAKE TWO TABLETS BY MOUTH DAILY 180 Tab 6    fluticasone (FLONASE) 50 mcg/actuation nasal spray 2 Sprays by Both Nostrils route once.  denosumab (PROLIA) 60 mg/mL injection 60 mg by SubCUTAneous route.  VITAMIN B COMPLEX (B COMPLETE PO) Take  by mouth.  loratadine (CLARITIN) 10 mg tablet Take 10 mg by mouth every morning.  multivitamin,tx-iron-ca-min (THERAPEUTIC MULTIVIT/MINERAL) 27-0.4 mg Tab Take 1 Tab by mouth daily.  cholecalciferol, vitamin d3, (VITAMIN D) 1,000 unit tablet Take  by mouth daily.  CALCIUM CARBONATE/VITAMIN D3 (CALCIUM 600 + D PO) Take 2 Tabs by mouth daily. Allergies   Allergen Reactions    Benzocaine Rash     topical     Family History   Problem Relation Age of Onset    Hypertension Mother     Cancer Mother      breast, colon    Asthma Father     Heart Disease Father     Arthritis-rheumatoid Brother     Hypertension Brother     Other Brother      rare blood disorder     Social History   Substance Use Topics    Smoking status: Former Smoker     Quit date: 1/1/1999    Smokeless tobacco: Never Used    Alcohol use 3.0 oz/week     3 Standard drinks or equivalent, 2 Glasses of wine per week     Diet, Lifestyle: No special diet    Exercise level: moderately active    Depression Risk Screen   No flowsheet data found. Alcohol Risk Screen   You do not drink alcohol or very rarely.     Functional Ability and Level of Safety   Hearing Loss  Hearing is good. Vision Screening  Vision is good. No exam data present      Activities of Daily Living  The home contains: no safety equipment. Patient does total self care    Fall Risk Screen  Fall Risk Assessment, last 12 mths 2/7/2018   Able to walk? Yes   Fall in past 12 months? No       Abuse Screen  Patient is not abused    Screening EKG   EKG order placed: Yes    Patient Care Team   Patient Care Team:  Blanka Tomas MD as PCP - General     End of Life Planning   Advanced care planning directives were discussed with the patient and /or family/caregiver. Assessment/Plan   Education and counseling provided:  Are appropriate based on today's review and evaluation    Diagnoses and all orders for this visit:    1. Welcome to Medicare preventive visit  -     TSH 3RD GENERATION  -     METABOLIC PANEL, COMPREHENSIVE  -     CBC WITH AUTOMATED DIFF  -     HEMOGLOBIN A1C WITH EAG  -     LIPID PANEL  -     URINALYSIS W/ RFLX MICROSCOPIC  -     AMB POC EKG Screen ([id number]) (Only Orderable in first 12 months of Medicare)    2. Need for Tdap vaccination  -     diph,Pertuss,Acell,,Tet Vac-PF (ADACEL) 2 Lf-(2.5-5-3-5 mcg)-5Lf/0.5 mL susp; 0.5 mL by IntraMUSCular route once for 1 dose. 3. Encounter for immunization  -     PNEUMOCOCCAL POLYSACCHARIDE VACCINE, 23-VALENT, ADULT OR IMMUNOSUPPRESSED PT DOSE,  -     pneumococcal 13 christian conj dip (PREVNAR-13) 0.5 mL syrg injection; 0.5 mL by IntraMUSCular route once for 1 dose. 4. Need for hepatitis C screening test  -     HEPATITIS C AB    5. Advance directive discussed with patient    6.  Screening for depression  -     Depression Screen Annual        Health Maintenance Due   Topic Date Due    Hepatitis C Screening  1950    DTaP/Tdap/Td series (1 - Tdap) 04/06/2007    Pneumococcal 65+ Low/Medium Risk (1 of 2 - PCV13) 04/24/2017

## 2018-02-07 NOTE — PROGRESS NOTES
HISTORY OF PRESENT ILLNESS  Nupur Osuna is a 74 y.o. female. TORRI Santos is seen today for a Welcome to Medicare visit, complete physical examination and follow up of chronic problems. Preventive medicine. Fully reviewed today. She is due for a complete physical examination and routine screening laboratory studies. Also, due for EKG, Tdap booster and Prevnar vaccination. She is up to date with mammogram, colonoscopy, bone density study and Pap smear in 2016. Stool testing for occult blood not indicated. For Medicare Wellness Visit, see attached note. Chronic medical problems are reviewed. Osteoporosis. Stable on Prolia. Mild depression. Stable, no significant worsening. She has handled the death of her  well. Review of systems notable for some pain in the right upper buttock. See physical exam.    MedDATA/gwo     Addendum- left upper buttock    Review of Systems   Constitutional: Negative for weight loss. Respiratory: Negative. Cardiovascular: Negative for chest pain, palpitations, leg swelling and PND. Gastrointestinal: Negative. Genitourinary: Negative. Musculoskeletal: Positive for back pain. Negative for myalgias. Neurological: Negative for focal weakness. Physical Exam   Constitutional: She is oriented to person, place, and time. She appears well-developed and well-nourished. No distress. HENT:   Head: Normocephalic and atraumatic. Right Ear: Tympanic membrane, external ear and ear canal normal.   Left Ear: Tympanic membrane, external ear and ear canal normal.   Eyes: EOM are normal. Pupils are equal, round, and reactive to light. Right eye exhibits no discharge. Left eye exhibits no discharge. Neck: Normal range of motion. Neck supple. Carotid bruit is not present. No thyromegaly present. Cardiovascular: Normal rate, regular rhythm, normal heart sounds and intact distal pulses. Exam reveals no gallop and no friction rub.     No murmur heard.  Pulmonary/Chest: Effort normal and breath sounds normal. No respiratory distress. She has no wheezes. She has no rales. Right breast exhibits no inverted nipple, no mass, no nipple discharge, no skin change and no tenderness. Left breast exhibits no inverted nipple, no mass, no nipple discharge, no skin change and no tenderness. Breasts are symmetrical.   Breast and back exam performed with JESSICA Johnson   Abdominal: Soft. Bowel sounds are normal. She exhibits no distension and no mass. There is no tenderness. There is no rebound and no guarding. Musculoskeletal: Normal range of motion. She exhibits no edema or tenderness. Legs:  Lymphadenopathy:     She has no cervical adenopathy. Neurological: She is alert and oriented to person, place, and time. She has normal reflexes. Skin: Skin is warm and dry. No rash noted. Psychiatric: She has a normal mood and affect. Her behavior is normal.   Nursing note and vitals reviewed. ASSESSMENT and PLAN  Diagnoses and all orders for this visit:    1. Welcome to Medicare preventive visit  -     AMB POC EKG Screen ([id number]) (Only Orderable in first 12 months of Medicare)    2. Need for Tdap vaccination  -     diph,Pertuss,Acell,,Tet Vac-PF (ADACEL) 2 Lf-(2.5-5-3-5 mcg)-5Lf/0.5 mL susp; 0.5 mL by IntraMUSCular route once for 1 dose. 3. Encounter for immunization  -     pneumococcal 13 christian conj dip (PREVNAR-13) 0.5 mL syrg injection; 0.5 mL by IntraMUSCular route once for 1 dose. 4. Need for hepatitis C screening test  -     HEPATITIS C AB    5. Advance directive discussed with patient    6. Screening for depression  -     Depression Screen Annual    7. Dysthymic disorder- Continue current regimen of prescription and / or OTC medications     8. Elevated LDL cholesterol level  -     TSH 3RD GENERATION  -     METABOLIC PANEL, COMPREHENSIVE  -     CBC WITH AUTOMATED DIFF  -     LIPID PANEL    9.  Disorder of bone and cartilage  -     URINALYSIS W/ RFLX MICROSCOPIC    10.  Mild depression ([facility] [address]- Continue current regimen of prescription and / or OTC medications

## 2018-02-07 NOTE — PATIENT INSTRUCTIONS

## 2018-02-09 ENCOUNTER — HOSPITAL ENCOUNTER (OUTPATIENT)
Dept: LAB | Age: 68
Discharge: HOME | End: 2018-02-09
Payer: MEDICARE

## 2018-02-09 PROBLEM — F32.A MILD DEPRESSION: Status: ACTIVE | Noted: 2018-02-09

## 2018-02-09 PROCEDURE — 84443 ASSAY THYROID STIM HORMONE: CPT

## 2018-02-09 PROCEDURE — 81003 URINALYSIS AUTO W/O SCOPE: CPT

## 2018-02-09 PROCEDURE — 86803 HEPATITIS C AB TEST: CPT

## 2018-02-09 PROCEDURE — 36415 COLL VENOUS BLD VENIPUNCTURE: CPT

## 2018-02-09 PROCEDURE — 80061 LIPID PANEL: CPT

## 2018-02-09 PROCEDURE — 80053 COMPREHEN METABOLIC PANEL: CPT

## 2018-02-09 PROCEDURE — 85025 COMPLETE CBC W/AUTO DIFF WBC: CPT

## 2018-02-10 LAB
ALBUMIN SERPL-MCNC: 4.2 G/DL (ref 3.6–4.8)
ALBUMIN/GLOB SERPL: 2.1 {RATIO} (ref 1.2–2.2)
ALP SERPL-CCNC: 58 IU/L (ref 39–117)
ALT SERPL-CCNC: 21 IU/L (ref 0–32)
APPEARANCE UR: CLEAR
AST SERPL-CCNC: 28 IU/L (ref 0–40)
BASOPHILS # BLD AUTO: 0 X10E3/UL (ref 0–0.2)
BASOPHILS NFR BLD AUTO: 1 %
BILIRUB SERPL-MCNC: 0.5 MG/DL (ref 0–1.2)
BILIRUB UR QL STRIP: NEGATIVE
BUN SERPL-MCNC: 10 MG/DL (ref 8–27)
BUN/CREAT SERPL: 14 (ref 12–28)
CALCIUM SERPL-MCNC: 9.1 MG/DL (ref 8.7–10.3)
CHLORIDE SERPL-SCNC: 101 MMOL/L (ref 96–106)
CHOLEST SERPL-MCNC: 171 MG/DL (ref 100–199)
CO2 SERPL-SCNC: 25 MMOL/L (ref 18–29)
COLOR UR: YELLOW
CREAT SERPL-MCNC: 0.7 MG/DL (ref 0.57–1)
EOSINOPHIL # BLD AUTO: 0.1 X10E3/UL (ref 0–0.4)
EOSINOPHIL NFR BLD AUTO: 3 %
ERYTHROCYTE [DISTWIDTH] IN BLOOD BY AUTOMATED COUNT: 13.9 % (ref 12.3–15.4)
GFR SERPLBLD CREATININE-BSD FMLA CKD-EPI: 105 ML/MIN/1.73
GFR SERPLBLD CREATININE-BSD FMLA CKD-EPI: 91 ML/MIN/1.73
GLOBULIN SER CALC-MCNC: 2 G/DL (ref 1.5–4.5)
GLUCOSE SERPL-MCNC: 73 MG/DL (ref 65–99)
GLUCOSE UR QL: NEGATIVE
HCT VFR BLD AUTO: 37.7 % (ref 34–46.6)
HCV AB S/CO SERPL IA: <0.1 S/CO RATIO (ref 0–0.9)
HDLC SERPL-MCNC: 54 MG/DL
HGB BLD-MCNC: 12.4 G/DL (ref 11.1–15.9)
HGB UR QL STRIP: NEGATIVE
IMM GRANULOCYTES # BLD: 0 X10E3/UL (ref 0–0.1)
IMM GRANULOCYTES NFR BLD: 0 %
KETONES UR QL STRIP: NEGATIVE
LDLC SERPL CALC-MCNC: 106 MG/DL (ref 0–99)
LEUKOCYTE ESTERASE UR QL STRIP: NEGATIVE
LYMPHOCYTES # BLD AUTO: 1 X10E3/UL (ref 0.7–3.1)
LYMPHOCYTES NFR BLD AUTO: 26 %
MCH RBC QN AUTO: 28.8 PG (ref 26.6–33)
MCHC RBC AUTO-ENTMCNC: 32.9 G/DL (ref 31.5–35.7)
MCV RBC AUTO: 88 FL (ref 79–97)
MICRO URNS: NORMAL
MONOCYTES # BLD AUTO: 0.5 X10E3/UL (ref 0.1–0.9)
MONOCYTES NFR BLD AUTO: 13 %
NEUTROPHILS # BLD AUTO: 2.2 X10E3/UL (ref 1.4–7)
NEUTROPHILS NFR BLD AUTO: 57 %
NITRITE UR QL STRIP: NEGATIVE
PH UR STRIP: 5.5 [PH] (ref 5–7.5)
PLATELET # BLD AUTO: 190 X10E3/UL (ref 150–379)
POTASSIUM SERPL-SCNC: 4.3 MMOL/L (ref 3.5–5.2)
PROT SERPL-MCNC: 6.2 G/DL (ref 6–8.5)
PROT UR QL STRIP: NEGATIVE
RBC # BLD AUTO: 4.3 X10E6/UL (ref 3.77–5.28)
SODIUM SERPL-SCNC: 143 MMOL/L (ref 134–144)
SP GR UR: 1.01 (ref 1–1.03)
TRIGL SERPL-MCNC: 57 MG/DL (ref 0–149)
TSH SERPL DL<=0.005 MIU/L-ACNC: 2.97 UIU/ML (ref 0.45–4.5)
UROBILINOGEN UR STRIP-MCNC: 0.2 MG/DL (ref 0.2–1)
VLDLC SERPL CALC-MCNC: 11 MG/DL (ref 5–40)
WBC # BLD AUTO: 3.8 X10E3/UL (ref 3.4–10.8)

## 2018-06-19 LAB — CREATININE, EXTERNAL: 0.8

## 2018-09-27 ENCOUNTER — OFFICE VISIT (OUTPATIENT)
Dept: INTERNAL MEDICINE CLINIC | Age: 68
End: 2018-09-27

## 2018-09-27 ENCOUNTER — HOSPITAL ENCOUNTER (OUTPATIENT)
Dept: GENERAL RADIOLOGY | Age: 68
Discharge: HOME | End: 2018-09-27
Payer: MEDICARE

## 2018-09-27 VITALS
TEMPERATURE: 98 F | BODY MASS INDEX: 22.99 KG/M2 | SYSTOLIC BLOOD PRESSURE: 110 MMHG | DIASTOLIC BLOOD PRESSURE: 62 MMHG | WEIGHT: 138 LBS | RESPIRATION RATE: 16 BRPM | OXYGEN SATURATION: 98 % | HEART RATE: 64 BPM | HEIGHT: 65 IN

## 2018-09-27 DIAGNOSIS — M79.671 RIGHT FOOT PAIN: ICD-10-CM

## 2018-09-27 DIAGNOSIS — M79.671 RIGHT FOOT PAIN: Primary | ICD-10-CM

## 2018-09-27 PROCEDURE — 73630 X-RAY EXAM OF FOOT: CPT

## 2018-09-27 RX ORDER — MELOXICAM 15 MG/1
15 TABLET ORAL DAILY
Qty: 10 TAB | Refills: 1 | Status: SHIPPED | OUTPATIENT
Start: 2018-09-27 | End: 2018-10-07

## 2018-09-27 NOTE — MR AVS SNAPSHOT
[facility] [address] [place] [address] 
[phone] Patient: Latisha Granados MRN:  [id number] Visit Information Date & Time Provider Department Dept. Phone Encounter #  
 9/27/2018  9:00 AM Jeffrey Almodovar 1229 [facility] Internal Medicine [phone] [id number] Upcoming Health Maintenance Date Due Shingrix Vaccine Age 50> (1 of 2) 4/24/2002 DTaP/Tdap/Td series (1 - Tdap) 4/6/2007 Pneumococcal 65+ Low/Medium Risk (1 of 2 - PCV13) 4/24/2017 Influenza Age 5 to Adult 8/1/2018 MEDICARE YEARLY EXAM 2/8/2019 GLAUCOMA SCREENING Q2Y 3/27/2019 BREAST CANCER SCRN MAMMOGRAM 9/26/2019 COLONOSCOPY 5/13/2021 Allergies as of 9/27/2018  Review Complete On: 9/27/2018 By: Jeffrey Almodovar MD  
  
 Severity Noted Reaction Type Reactions Benzocaine  02/16/2010    Rash  
 topical  
  
Current Immunizations  Reviewed on 4/27/2017 Name Date Influenza Vaccine 11/1/2016 TD Vaccine 4/5/2007 Zoster Vaccine, Live 11/21/2014 Not reviewed this visit You Were Diagnosed With   
  
 Codes Comments Right foot pain    -  Primary ICD-10-CM: N83.325 ICD-9-CM: 729.5 Vitals BP Pulse Temp Resp Height(growth percentile) Weight(growth percentile) 110/62 64 98 °F (36.7 °C) (Oral) 16 5' 5\" (1.651 m) 138 lb (62.6 kg) SpO2 BMI OB Status Smoking Status 98% 22.96 kg/m2 Postmenopausal Former Smoker Vitals History BMI and BSA Data Body Mass Index Body Surface Area  
 22.96 kg/m 2 1.69 m 2 Preferred Pharmacy Pharmacy Name Phone Franci Hives 1505 [facility], [address] [place] [address] [phone] Your Updated Medication List  
  
   
This list is accurate as of 9/27/18  9:50 AM.  Always use your most recent med list.  
  
  
  
  
 B COMPLETE PO Take  by mouth. CALCIUM 600 + D PO Take 2 Tabs by mouth daily. citalopram 20 mg tablet Commonly known as:  CELEXA  
TAKE TWO TABLETS BY MOUTH DAILY CLARITIN 10 mg tablet Generic drug:  loratadine Take 10 mg by mouth every morning. denosumab 60 mg/mL injection Commonly known as:  Humphrey Beljesus 60 mg by SubCUTAneous route. fluticasone 50 mcg/actuation nasal spray Commonly known as:  Euna Mitchell 2 Sprays by Both Nostrils route once. meloxicam 15 mg tablet Commonly known as:  MOBIC Take 1 Tab by mouth daily for 10 days. THERAPEUTIC MULTIVIT/MINERAL 27-0.4 mg Tab Generic drug:  multivitamin,tx-iron-ca-min Take 1 Tab by mouth daily. VITAMIN D3 1,000 unit tablet Generic drug:  cholecalciferol Take  by mouth daily. Prescriptions Sent to Pharmacy Refills  
 meloxicam (MOBIC) 15 mg tablet 1 Sig: Take 1 Tab by mouth daily for 10 days. Class: Normal  
 Pharmacy: [address] #: [phone] Route: Oral  
  
We Performed the Following REFERRAL TO ORTHOPEDICS [OOC499 Custom] To-Do List   
 09/27/2018 Imaging:  XR FOOT RT MIN 3 V Referral Information Referral ID Referred By Referred To  
  
 [id number] YAJAIRA, [address] [facility] MD Sadi   
   [facility] [address] Phone: [phone] Fax: [phone] Visits Status Start Date End Date 1 New Request 9/27/18 9/27/19 If your referral has a status of pending review or denied, additional information will be sent to support the outcome of this decision. Introducing [facility]! [facility] introduces [employer] patient portal. Now you can access parts of your medical record, email your doctor's office, and request medication refills online. 1. In your internet browser, go to [email or web address] 2. Click on the First Time User? Click Here link in the Sign In box. You will see the New Member Sign Up page. 3. Enter your [employer] Access Code exactly as it appears below. You will not need to use this code after youve completed the sign-up process. If you do not sign up before the expiration date, you must request a new code. · [employer] Access Code: [email or web address] Expires: 12/26/2018  9:07 AM 
 
4. Enter the last four digits of your Social Security Number (xxxx) and Date of Birth (mm/dd/yyyy) as indicated and click Submit. You will be taken to the next sign-up page. 5. Create a [employer] ID. This will be your [employer] login ID and cannot be changed, so think of one that is secure and easy to remember. 6. Create a [employer] password. You can change your password at any time. 7. Enter your Password Reset Question and Answer. This can be used at a later time if you forget your password. 8. Enter your e-mail address. You will receive e-mail notification when new information is available in [address] 9. Click Sign Up. You can now view and download portions of your medical record. 10. Click the Download Summary menu link to download a portable copy of your medical information. If you have questions, please visit the Frequently Asked Questions section of the [employer] website. Remember, [employer] is NOT to be used for urgent needs. For medical emergencies, dial 911. Now available from your iPhone and Android! Please provide this summary of care documentation to your next provider. Your primary care clinician is listed as SHITAL GATES. If you have any questions after today's visit, please call [phone].

## 2018-09-27 NOTE — PROGRESS NOTES
HISTORY OF PRESENT ILLNESS  Carol Ann Cuevas is a 79 y.o. female. Foot Swelling    The history is provided by the patient. This is a new problem. Episode onset: 2 wks. The problem has not changed since onset. The pain is present in the right foot. The pain is at a severity of 4/10. Associated symptoms include limited range of motion and stiffness. Pertinent negatives include no numbness and no tingling. The symptoms are aggravated by activity, standing and palpation. She has tried nothing for the symptoms. There has been no history of extremity trauma (is very active in the gym but doesn't do high impact activities). no personal hx of foot problems       Review of Systems   Constitutional: Negative for chills and fever. Cardiovascular: Negative for leg swelling. Musculoskeletal: Positive for joint pain and stiffness. Neurological: Negative for tingling and numbness. Physical Exam   Musculoskeletal: She exhibits no edema. Right foot: There is bony tenderness and swelling. There is normal range of motion, no tenderness and no deformity. Feet:        ASSESSMENT and PLAN  Diagnoses and all orders for this visit:    1. Right foot pain  -     meloxicam (MOBIC) 15 mg tablet; Take 1 Tab by mouth daily for 10 days.   -     XR FOOT RT MIN 3 V; Future  -     REFERRAL TO ORTHOPEDICS- if persists or there's a stress fx

## 2018-09-27 NOTE — PROGRESS NOTES
Advised pt her xray is normal. Proceed with plan as discussed.
Call- xray is normal, Proceed with plan as discussed
Information could not be obtained

## 2018-12-02 DIAGNOSIS — F34.1 DYSTHYMIC DISORDER: ICD-10-CM

## 2018-12-03 RX ORDER — CITALOPRAM 20 MG/1
TABLET, FILM COATED ORAL
Qty: 180 TAB | Refills: 5 | Status: SHIPPED | OUTPATIENT
Start: 2018-12-03 | End: 2019-12-08 | Stop reason: SDUPTHER

## 2018-12-18 ENCOUNTER — OFFICE VISIT (OUTPATIENT)
Dept: INTERNAL MEDICINE CLINIC | Age: 68
End: 2018-12-18

## 2018-12-18 VITALS
DIASTOLIC BLOOD PRESSURE: 60 MMHG | WEIGHT: 136.4 LBS | BODY MASS INDEX: 22.73 KG/M2 | RESPIRATION RATE: 17 BRPM | SYSTOLIC BLOOD PRESSURE: 100 MMHG | HEART RATE: 72 BPM | HEIGHT: 65 IN | TEMPERATURE: 99 F | OXYGEN SATURATION: 96 %

## 2018-12-18 DIAGNOSIS — K52.9 COLITIS: ICD-10-CM

## 2018-12-18 DIAGNOSIS — R19.7 DIARRHEA, UNSPECIFIED TYPE: Primary | ICD-10-CM

## 2018-12-18 LAB
BILIRUB UR QL STRIP: NEGATIVE
GLUCOSE UR-MCNC: NEGATIVE MG/DL
KETONES P FAST UR STRIP-MCNC: NEGATIVE MG/DL
PH UR STRIP: 6 [PH] (ref 4.6–8)
PROT UR QL STRIP: NEGATIVE
SP GR UR STRIP: 1.02 (ref 1–1.03)
UA UROBILINOGEN AMB POC: NORMAL (ref 0.2–1)
URINALYSIS CLARITY POC: CLEAR
URINALYSIS COLOR POC: YELLOW
URINE BLOOD POC: NEGATIVE
URINE LEUKOCYTES POC: NEGATIVE
URINE NITRITES POC: NEGATIVE

## 2018-12-18 RX ORDER — DOXYCYCLINE 100 MG/1
100 TABLET ORAL 2 TIMES DAILY
Qty: 14 TAB | Refills: 0 | Status: SHIPPED | OUTPATIENT
Start: 2018-12-18 | End: 2018-12-25

## 2018-12-18 NOTE — PATIENT INSTRUCTIONS
Colitis: Care Instructions  Your Care Instructions  Colitis is the medical term for swelling (inflammation) of the intestine. It can be caused by different things, such as an infection or loss of blood flow in the intestine. Other causes are problems like Crohn's disease or ulcerative colitis. Symptoms may include fever, diarrhea that may be bloody, or belly pain. Sometimes symptoms go away without treatment. But you may need treatment or more tests, such as blood tests or a stool test. Or you may need imaging tests like a CT scan or a colonoscopy. In some cases, the doctor may want to test a sample of tissue from the intestine. This test is called a biopsy. The doctor has checked you carefully, but problems can develop later. If you notice any problems or new symptoms, get medical treatment right away. Follow-up care is a key part of your treatment and safety. Be sure to make and go to all appointments, and call your doctor if you are having problems. It's also a good idea to know your test results and keep a list of the medicines you take. How can you care for yourself at home? · Rest until you feel better. · Your doctor may recommend that you eat bland foods. These include rice, dry toast or crackers, bananas, and applesauce. · To prevent dehydration, drink plenty of fluids. Choose water and other caffeine-free clear liquids until you feel better. If you have kidney, heart, or liver disease and have to limit fluids, talk with your doctor before you increase the amount of fluids you drink. · Be safe with medicines. Take your medicines exactly as prescribed. Call your doctor if you think you are having a problem with your medicine. You will get more details on the specific medicines your doctor prescribes. When should you call for help? Call 911 anytime you think you may need emergency care. For example, call if:    · You passed out (lost consciousness).     · Your stools are maroon or very bloody.  Call your doctor now or seek immediate medical care if:    · You have new or worse belly pain.     · You have a fever.     · You are vomiting.     · You cannot pass stools or gas.     · You have new or more blood in your stools.    Watch closely for changes in your health, and be sure to contact your doctor if:    · You have new or worse symptoms.     · You are losing weight.     · You do not get better as expected. Where can you learn more? Go to http://samanta-andrew.info/. Xiomy Cade in the search box to learn more about \"Colitis: Care Instructions. \"  Current as of: March 28, 2018  Content Version: 11.8  © 5830-8196 [employer]. Care instructions adapted under license by [employer] (which disclaims liability or warranty for this information). If you have questions about a medical condition or this instruction, always ask your healthcare professional. [address] any warranty or liability for your use of this information.

## 2018-12-18 NOTE — PROGRESS NOTES
Acute Care Note    Johanne Gibson is 79 y.o. female. she presents for evaluation of Diarrhea and Fever    Diarrhea  Patient complains of diarrhea. Onset of diarrhea was 2 days ago. Diarrhea is occurring approximately 4 times per day. Patient describes diarrhea as watery. Diarrhea has been associated with abdominal pain aching as well as fever. Patient denies blood in stool, recent camping, recent travel. Previous visits for diarrhea: none. Evaluation to date: none. Prior to Admission medications    Medication Sig Start Date End Date Taking? Authorizing Provider   citalopram (CELEXA) 20 mg tablet TAKE TWO TABLETS BY MOUTH DAILY 12/3/18  Yes Bruno Blank MD   fluticasone (FLONASE) 50 mcg/actuation nasal spray 2 Sprays by Both Nostrils route once. Yes Provider, Historical   denosumab (PROLIA) 60 mg/mL injection 60 mg by SubCUTAneous route. Yes Provider, Historical   VITAMIN B COMPLEX (B COMPLETE PO) Take  by mouth. Yes Provider, Historical   loratadine (CLARITIN) 10 mg tablet Take 10 mg by mouth every morning. 2/16/10  Yes Provider, Historical   multivitamin,tx-iron-ca-min (THERAPEUTIC MULTIVIT/MINERAL) 27-0.4 mg Tab Take 1 Tab by mouth daily. Yes Provider, Historical   cholecalciferol, vitamin d3, (VITAMIN D) 1,000 unit tablet Take  by mouth daily. Yes Provider, Historical   CALCIUM CARBONATE/VITAMIN D3 (CALCIUM 600 + D PO) Take 2 Tabs by mouth daily. 2/16/10  Yes Provider, Historical         Patient Active Problem List   Diagnosis Code    Iron deficiency anemia, unspecified D50.9    Symptomatic menopausal or female climacteric states N95.1    Benign neoplasm of colon D12.6    Dysthymic disorder F34.1    Allergic rhinitis, cause unspecified J30.9    Disorder of bone and cartilage M89.9, M94.9    Advance directive discussed with patient Z71.89    Mild depression ([facility] [address] F32.0         Review of Systems   Constitutional: Positive for fever. Gastrointestinal: Positive for diarrhea. Negative for nausea and vomiting. Visit Vitals  /60 (BP 1 Location: Right arm, BP Patient Position: Sitting)   Pulse 72   Temp 99 °F (37.2 °C) (Oral)   Resp 17   Ht 5' 5\" (1.651 m)   Wt 136 lb 6.4 oz (61.9 kg)   SpO2 96%   BMI 22.70 kg/m²       Physical Exam   Constitutional: She is oriented to person, place, and time. No distress. Cardiovascular: Normal rate and regular rhythm. Pulmonary/Chest: Effort normal and breath sounds normal.   Abdominal: Soft. There is tenderness (present at the left lower quadrant). There is no rebound. Neurological: She is alert and oriented to person, place, and time. ASSESSMENT/PLAN  Diagnoses and all orders for this visit:    1. Diarrhea, unspecified type  -     AMB POC URINALYSIS DIP STICK AUTO W/O MICRO    2. Colitis  -     doxycycline (ADOXA) 100 mg tablet; Take 1 Tab by mouth two (2) times a day for 7 days. Advised the patient to call back or return to office if symptoms worsen/change/persist.   Discussed expected course/resolution/complications of diagnosis in detail with patient. Medication risks/benefits/costs/interactions/alternatives discussed with patient. The patient was given an after visit summary which includes diagnoses, current medications, & vitals. They expressed understanding with the diagnosis and plan.

## 2019-01-07 LAB — CREATININE, EXTERNAL: 0.8

## 2019-01-28 ENCOUNTER — OFFICE VISIT (OUTPATIENT)
Dept: INTERNAL MEDICINE CLINIC | Age: 69
End: 2019-01-28

## 2019-01-28 VITALS
DIASTOLIC BLOOD PRESSURE: 78 MMHG | HEART RATE: 73 BPM | RESPIRATION RATE: 16 BRPM | BODY MASS INDEX: 22.49 KG/M2 | TEMPERATURE: 97.6 F | OXYGEN SATURATION: 97 % | HEIGHT: 65 IN | SYSTOLIC BLOOD PRESSURE: 122 MMHG | WEIGHT: 135 LBS

## 2019-01-28 DIAGNOSIS — M72.2 PLANTAR FASCIITIS, RIGHT: Primary | ICD-10-CM

## 2019-01-28 DIAGNOSIS — M54.6 CHRONIC BILATERAL THORACIC BACK PAIN: ICD-10-CM

## 2019-01-28 DIAGNOSIS — G89.29 CHRONIC BILATERAL THORACIC BACK PAIN: ICD-10-CM

## 2019-01-28 NOTE — PATIENT INSTRUCTIONS
Learning About Relief for Back Pain  What is back tension and strain? Back strain happens when you overstretch, or pull, a muscle in your back. You may hurt your back in an accident or when you exercise or lift something. Most back pain will get better with rest and time. You can take care of yourself at home to help your back heal.  What can you do first to relieve back pain? When you first feel back pain, try these steps:  · Walk. Take a short walk (10 to 20 minutes) on a level surface (no slopes, hills, or stairs) every 2 to 3 hours. Walk only distances you can manage without pain, especially leg pain. · Relax. Find a comfortable position for rest. Some people are comfortable on the floor or a medium-firm bed with a small pillow under their head and another under their knees. Some people prefer to lie on their side with a pillow between their knees. Don't stay in one position for too long. · Try heat or ice. Try using a heating pad on a low or medium setting, or take a warm shower, for 15 to 20 minutes every 2 to 3 hours. Or you can buy single-use heat wraps that last up to 8 hours. You can also try an ice pack for 10 to 15 minutes every 2 to 3 hours. You can use an ice pack or a bag of frozen vegetables wrapped in a thin towel. There is not strong evidence that either heat or ice will help, but you can try them to see if they help. You may also want to try switching between heat and cold. · Take pain medicine exactly as directed. ? If the doctor gave you a prescription medicine for pain, take it as prescribed. ? If you are not taking a prescription pain medicine, ask your doctor if you can take an over-the-counter medicine. What else can you do? · Stretch and exercise. Exercises that increase flexibility may relieve your pain and make it easier for your muscles to keep your spine in a good, neutral position. And don't forget to keep walking. · Do self-massage.  You can use self-massage to unwind after work or school or to energize yourself in the morning. You can easily massage your feet, hands, or neck. Self-massage works best if you are in comfortable clothes and are sitting or lying in a comfortable position. Use oil or lotion to massage bare skin. · Reduce stress. Back pain can lead to a vicious [place]: Distress about the pain tenses the muscles in your back, which in turn causes more pain. Learn how to relax your mind and your muscles to lower your stress. Where can you learn more? Go to http://samanta-andrew.info/. Enter S331 in the search box to learn more about \"Learning About Relief for Back Pain. \"  Current as of: September 20, 2018  Content Version: 11.9  © 2259-4245 [employer]. Care instructions adapted under license by [employer] (which disclaims liability or warranty for this information). If you have questions about a medical condition or this instruction, always ask your healthcare professional. [place] [address] any warranty or liability for your use of this information.

## 2019-01-28 NOTE — PROGRESS NOTES
HISTORY OF PRESENT ILLNESS  Marty Bautista is a 79 y.o. female. Back Pain    The history is provided by the patient. This is a chronic problem. Episode onset: 2 yrs. The problem has not changed since onset. The problem occurs every several days. Patient reports not work related injury. The pain is associated with no known injury. The pain is present in the middle back, left side and right side. The quality of the pain is described as aching. The pain does not radiate. The pain is moderate. The symptoms are aggravated by bending, twisting and certain positions. Pertinent negatives include no fever, no numbness, no leg pain, no paresthesias, no paresis, no tingling and no weakness. Treatments tried: stretchng, Yoga. The treatment provided mild relief. The patient's surgical history non-contributory   Foot Pain   The history is provided by the patient (recurrent plantar fasciitis). This is a chronic problem. Episode onset: flare one month. The problem has not changed since onset. Review of Systems   Constitutional: Negative for fever. Musculoskeletal: Positive for back pain. Neurological: Negative for tingling, weakness, numbness and paresthesias. Physical Exam   Musculoskeletal:        Thoracic back: She exhibits normal range of motion, no tenderness, no bony tenderness and no deformity. Back:         Feet:    Neurological: She is alert. Skin: Skin is warm and dry. Psychiatric: She has a normal mood and affect. Her behavior is normal.   Nursing note and vitals reviewed. ASSESSMENT and PLAN  Diagnoses and all orders for this visit:    1. Plantar fasciitis, right  -     REFERRAL TO ORTHOPEDIC SURGERY  -     REFERRAL TO ORTHOPEDIC SURGERY    2.  Chronic bilateral thoracic back pain  -     XR SPINE THORAC 3 V; Future  - Stretching, advised Egoscue method review

## 2019-01-29 ENCOUNTER — HOSPITAL ENCOUNTER (OUTPATIENT)
Dept: GENERAL RADIOLOGY | Age: 69
Discharge: HOME | End: 2019-01-29
Payer: MEDICARE

## 2019-01-29 DIAGNOSIS — G89.29 CHRONIC BILATERAL THORACIC BACK PAIN: ICD-10-CM

## 2019-01-29 DIAGNOSIS — M54.6 CHRONIC BILATERAL THORACIC BACK PAIN: ICD-10-CM

## 2019-01-29 PROCEDURE — 72072 X-RAY EXAM THORAC SPINE 3VWS: CPT

## 2019-01-29 NOTE — PROGRESS NOTES
Call- xray shows 2 things that may account for her back pain  - an old compression fracture, severe arthritis.  Proceed with plan as discussed but if pain should worsen would advise orthopedic consultation

## 2019-01-30 NOTE — PROGRESS NOTES
Advised pt her xray shows 2 things that may account for her back pain: an old compression fracture and severe arthritis. Proceed with plan as discussed but if pain should worsen would advise orthopedic consultation.

## 2019-03-07 ENCOUNTER — HOSPITAL ENCOUNTER (OUTPATIENT)
Dept: LAB | Age: 69
Discharge: HOME | End: 2019-03-07
Payer: MEDICARE

## 2019-03-07 ENCOUNTER — OFFICE VISIT (OUTPATIENT)
Dept: INTERNAL MEDICINE CLINIC | Age: 69
End: 2019-03-07

## 2019-03-07 VITALS
BODY MASS INDEX: 22.87 KG/M2 | OXYGEN SATURATION: 97 % | HEIGHT: 65 IN | RESPIRATION RATE: 18 BRPM | WEIGHT: 137.25 LBS | HEART RATE: 63 BPM | TEMPERATURE: 98.2 F | DIASTOLIC BLOOD PRESSURE: 74 MMHG | SYSTOLIC BLOOD PRESSURE: 122 MMHG

## 2019-03-07 DIAGNOSIS — M94.9 DISORDER OF BONE AND CARTILAGE: ICD-10-CM

## 2019-03-07 DIAGNOSIS — M89.9 DISORDER OF BONE AND CARTILAGE: ICD-10-CM

## 2019-03-07 DIAGNOSIS — Z00.00 INITIAL MEDICARE ANNUAL WELLNESS VISIT: Primary | ICD-10-CM

## 2019-03-07 DIAGNOSIS — F34.1 DYSTHYMIC DISORDER: ICD-10-CM

## 2019-03-07 DIAGNOSIS — E78.00 ELEVATED LDL CHOLESTEROL LEVEL: ICD-10-CM

## 2019-03-07 DIAGNOSIS — Z23 ENCOUNTER FOR IMMUNIZATION: ICD-10-CM

## 2019-03-07 DIAGNOSIS — Z12.11 SCREEN FOR COLON CANCER: ICD-10-CM

## 2019-03-07 PROCEDURE — 80053 COMPREHEN METABOLIC PANEL: CPT

## 2019-03-07 PROCEDURE — 36415 COLL VENOUS BLD VENIPUNCTURE: CPT

## 2019-03-07 PROCEDURE — 84443 ASSAY THYROID STIM HORMONE: CPT

## 2019-03-07 PROCEDURE — 80061 LIPID PANEL: CPT

## 2019-03-07 PROCEDURE — 81001 URINALYSIS AUTO W/SCOPE: CPT

## 2019-03-07 PROCEDURE — 86800 THYROGLOBULIN ANTIBODY: CPT

## 2019-03-07 PROCEDURE — 85025 COMPLETE CBC W/AUTO DIFF WBC: CPT

## 2019-03-07 RX ORDER — TRIAMCINOLONE ACETONIDE 55 UG/1
2 SPRAY, METERED NASAL DAILY
COMMUNITY

## 2019-03-07 NOTE — PROGRESS NOTES
This is an Initial Medicare Annual Wellness Exam (AWV) (Performed 12 months after IPPE or effective date of Medicare Part B enrollment, Once in a lifetime)    I have reviewed the patient's medical history in detail and updated the computerized patient record. History   History reviewed. No pertinent past medical history. Past Surgical History:   Procedure Laterality Date    ENDOSCOPY, COLON, DIAGNOSTIC      11, due 16     Current Outpatient Medications   Medication Sig Dispense Refill    triamcinolone (NASACORT) 55 mcg nasal inhaler 2 Sprays daily.  varicella-zoster recombinant, PF, (SHINGRIX, PF,) 50 mcg/0.5 mL susr injection 0.5mL by IntraMUSCular route once now and then repeat in 2-6 months 0.5 mL 1    citalopram (CELEXA) 20 mg tablet TAKE TWO TABLETS BY MOUTH DAILY 180 Tab 5    denosumab (PROLIA) 60 mg/mL injection 60 mg by SubCUTAneous route.  VITAMIN B COMPLEX (B COMPLETE PO) Take  by mouth.  loratadine (CLARITIN) 10 mg tablet Take 10 mg by mouth every morning.  multivitamin,tx-iron-ca-min (THERAPEUTIC MULTIVIT/MINERAL) 27-0.4 mg Tab Take 1 Tab by mouth daily.  cholecalciferol, vitamin d3, (VITAMIN D) 1,000 unit tablet Take  by mouth daily.  CALCIUM CARBONATE/VITAMIN D3 (CALCIUM 600 + D PO) Take 2 Tabs by mouth daily. Allergies   Allergen Reactions    Benzocaine Rash     topical     Family History   Problem Relation Age of Onset    Hypertension Mother     Cancer Mother         breast, colon    Asthma Father     Heart Disease Father     Arthritis-rheumatoid Brother     Hypertension Brother     Other Brother         rare blood disorder     Social History     Tobacco Use    Smoking status: Former Smoker     Last attempt to quit: 1999     Years since quittin.1    Smokeless tobacco: Never Used   Substance Use Topics    Alcohol use:  Yes     Alcohol/week: 3.0 oz     Types: 3 Standard drinks or equivalent, 2 Glasses of wine per week     Patient Active Problem List   Diagnosis Code    Iron deficiency anemia, unspecified D50.9    Symptomatic menopausal or female climacteric states N95.1    Benign neoplasm of colon D12.6    Dysthymic disorder F34.1    Allergic rhinitis, cause unspecified J30.9    Disorder of bone and cartilage M89.9, M94.9    Advance directive discussed with patient Z71.89    Mild depression (HCC) F32.0       Depression Risk Factor Screening:   No flowsheet data found. Alcohol Risk Factor Screenin per wk    Functional Ability and Level of Safety:     Hearing Loss  Hearing loss  is mild. Activities of Daily Living  The home contains: no safety equipment. Patient does total self care    Fall Risk  Fall Risk Assessment, last 12 mths 3/7/2019   Able to walk? Yes   Fall in past 12 months? No       Abuse Screen  Patient is not abused    Cognitive Screening   Evaluation of Cognitive Function:  Has your family/caregiver stated any concerns about your memory: no  Normal    Patient Care Team   Patient Care Team:  Gerardo Bal MD as PCP - General    Assessment/Plan   Education and counseling provided:  Are appropriate based on today's review and evaluation    Diagnoses and all orders for this visit:    1. Dysthymic disorder    2. Encounter for immunization  -     ADMIN PNEUMOCOCCAL VACCINE  -     varicella-zoster recombinant, PF, (SHINGRIX, PF,) 50 mcg/0.5 mL susr injection; 0.5mL by IntraMUSCular route once now and then repeat in 2-6 months    3. Elevated LDL cholesterol level    4. Disorder of bone and cartilage    5.  Initial Medicare annual wellness visit         Health Maintenance Due   Topic Date Due    Shingrix Vaccine Age 49> (1 of 2) 2002    DTaP/Tdap/Td series (1 - Tdap) 2007    Pneumococcal 65+ Low/Medium Risk (1 of 2 - PCV13) 2017    Influenza Age 9 to Adult  2018    GLAUCOMA SCREENING Q2Y  2019

## 2019-03-07 NOTE — PROGRESS NOTES
HISTORY OF PRESENT ILLNESS  Jada Horn is a 79 y.o. female. HPI Subjective:  Babita Berumen is seen today for a Medicare wellness visit and follow up of chronic problems. 1. Medicare wellness visit. See attached note. For preventive medicine she is due for the shingles vaccine and Pneumovax. She feels she had the Prevnar and TDAP booster, so I will check with [facility], as we have no documentation. She is also due for labs, eye exam and colorectal cancer screening with stool OB. A kit was provided. She is up to date with mammogram and colonoscopy. 2. Chronic problems are reviewed. a. Dysthymia, stable on current regimen. b. Osteoporosis. She follows up with rheumatology. c. Chronic foot pain. Orthopedic consultation is pending. d. Chronic back pain, improved down to about 1/10 in intensity. Review of Systems   Constitutional: Negative for weight loss. Respiratory: Negative. Cardiovascular: Negative for chest pain, palpitations, leg swelling and PND. Gastrointestinal: Negative. Genitourinary: Negative. Musculoskeletal: Positive for back pain and joint pain. Negative for myalgias. Neurological: Negative for focal weakness. Physical Exam   Constitutional: She is oriented to person, place, and time. She appears well-developed and well-nourished. No distress. HENT:   Head: Normocephalic and atraumatic. Right Ear: Tympanic membrane, external ear and ear canal normal.   Left Ear: Tympanic membrane, external ear and ear canal normal.   Eyes: EOM are normal. Pupils are equal, round, and reactive to light. Right eye exhibits no discharge. Left eye exhibits no discharge. Neck: Normal range of motion. Neck supple. Carotid bruit is not present. No thyromegaly present. Cardiovascular: Normal rate, regular rhythm, normal heart sounds and intact distal pulses. Exam reveals no gallop and no friction rub. No murmur heard.   Pulmonary/Chest: Effort normal and breath sounds normal. No respiratory distress. She has no wheezes. She has no rales. Abdominal: Soft. Bowel sounds are normal. She exhibits no distension and no mass. There is no tenderness. There is no rebound and no guarding. Musculoskeletal: Normal range of motion. She exhibits no edema or tenderness. Lymphadenopathy:     She has no cervical adenopathy. Neurological: She is alert and oriented to person, place, and time. She has normal reflexes. Skin: Skin is warm and dry. No rash noted. Psychiatric: She has a normal mood and affect. Her behavior is normal.   Nursing note and vitals reviewed. ASSESSMENT and PLAN  Diagnoses and all orders for this visit:    1. Initial Medicare annual wellness visit    2. Dysthymic disorder- Continue current regimen of prescription and / or OTC medications     3. Encounter for immunization  -     ADMIN PNEUMOCOCCAL VACCINE  -     varicella-zoster recombinant, PF, (SHINGRIX, PF,) 50 mcg/0.5 mL susr injection; 0.5mL by IntraMUSCular route once now and then repeat in 2-6 months  -     pneumococcal 23-valent (PNEUMOVAX 23) 25 mcg/0.5 mL injection; 0.5 mL by IntraMUSCular route once for 1 dose. 4. Elevated LDL cholesterol level  -     METABOLIC PANEL, COMPREHENSIVE  -     CBC WITH AUTOMATED DIFF  -     LIPID PANEL  -     TSH 3RD GENERATION  -     URINALYSIS W/ RFLX MICROSCOPIC    5. Disorder of bone and cartilage- See rheumatologist as directed      6.  Screen for colon cancer  -     OCCULT BLOOD IMMUNOASSAY,DIAGNOSTIC    Other orders  -     MICROSCOPIC EXAMINATION

## 2019-03-07 NOTE — PATIENT INSTRUCTIONS
Medicare Wellness Visit, Female     The best way to live healthy is to have a lifestyle where you eat a well-balanced diet, exercise regularly, limit alcohol use, and quit all forms of tobacco/nicotine, if applicable. Regular preventive services are another way to keep healthy. Preventive services (vaccines, screening tests, monitoring & exams) can help personalize your care plan, which helps you manage your own care. Screening tests can find health problems at the earliest stages, when they are easiest to treat. Isma Abdul follows the current, evidence-based guidelines published by the [facility] Nina ([facility]) when recommending preventive services for our patients. Because we follow these guidelines, sometimes recommendations change over time as research supports it. (For example, mammograms used to be recommended annually. Even though Medicare will still pay for an annual mammogram, the newer guidelines recommend a mammogram every two years for women of average risk.)  Of course, you and your doctor may decide to screen more often for some diseases, based on your risk and your health status. Preventive services for you include:  - Medicare offers their members a free annual wellness visit, which is time for you and your primary care provider to discuss and plan for your preventive service needs. Take advantage of this benefit every year!  -All adults over the age of 72 should receive the recommended pneumonia vaccines. Current USPSTF guidelines recommend a series of two vaccines for the best pneumonia protection.   -All adults should have a flu vaccine yearly and a tetanus vaccine every 10 years. All adults age 61 and older should receive a shingles vaccine once in their lifetime.    -A bone mass density test is recommended when a woman turns 65 to screen for osteoporosis. This test is only recommended one time, as a screening.  Some providers will use this same test as a disease monitoring tool if you already have osteoporosis. -All adults age 38-68 who are overweight should have a diabetes screening test once every three years.   -Other screening tests and preventive services for persons with diabetes include: an eye exam to screen for diabetic retinopathy, a kidney function test, a foot exam, and stricter control over your cholesterol.   -Cardiovascular screening for adults with routine risk involves an electrocardiogram (ECG) at intervals determined by your doctor.   -Colorectal cancer screenings should be done for adults age 54-65 with no increased risk factors for colorectal cancer. There are a number of acceptable methods of screening for this type of cancer. Each test has its own benefits and drawbacks. Discuss with your doctor what is most appropriate for you during your annual wellness visit. The different tests include: colonoscopy (considered the best screening method), a fecal occult blood test, a fecal DNA test, and sigmoidoscopy. -Breast cancer screenings are recommended every other year for women of normal risk, age 54-69.  -Cervical cancer screenings for women over age 72 are only recommended with certain risk factors.   -All adults born between [facility] should be screened once for Hepatitis C.      Here is a list of your current Health Maintenance items (your personalized list of preventive services) with a due date:  Health Maintenance Due   Topic Date Due    Shingles Vaccine (1 of 2) 04/24/2002    DTaP/Tdap/Td  (1 - Tdap) 04/06/2007    Pneumococcal Vaccine (1 of 2 - PCV13) 04/24/2017    Flu Vaccine  08/01/2018    Glaucoma Screening   03/27/2019

## 2019-03-08 LAB
ALBUMIN SERPL-MCNC: 4.4 G/DL (ref 3.6–4.8)
ALBUMIN/GLOB SERPL: 1.5 {RATIO} (ref 1.2–2.2)
ALP SERPL-CCNC: 73 IU/L (ref 39–117)
ALT SERPL-CCNC: 29 IU/L (ref 0–32)
APPEARANCE UR: CLEAR
AST SERPL-CCNC: 22 IU/L (ref 0–40)
BACTERIA #/AREA URNS HPF: NORMAL /[HPF]
BASOPHILS # BLD AUTO: 0.1 X10E3/UL (ref 0–0.2)
BASOPHILS NFR BLD AUTO: 2 %
BILIRUB SERPL-MCNC: 0.5 MG/DL (ref 0–1.2)
BILIRUB UR QL STRIP: NEGATIVE
BUN SERPL-MCNC: 11 MG/DL (ref 8–27)
BUN/CREAT SERPL: 14 (ref 12–28)
CALCIUM SERPL-MCNC: 9.5 MG/DL (ref 8.7–10.3)
CASTS URNS QL MICRO: NORMAL /LPF
CHLORIDE SERPL-SCNC: 105 MMOL/L (ref 96–106)
CHOLEST SERPL-MCNC: 202 MG/DL (ref 100–199)
CO2 SERPL-SCNC: 24 MMOL/L (ref 20–29)
COLOR UR: YELLOW
CREAT SERPL-MCNC: 0.76 MG/DL (ref 0.57–1)
EOSINOPHIL # BLD AUTO: 0.2 X10E3/UL (ref 0–0.4)
EOSINOPHIL NFR BLD AUTO: 3 %
EPI CELLS #/AREA URNS HPF: NORMAL /HPF
ERYTHROCYTE [DISTWIDTH] IN BLOOD BY AUTOMATED COUNT: 13.4 % (ref 12.3–15.4)
GLOBULIN SER CALC-MCNC: 2.9 G/DL (ref 1.5–4.5)
GLUCOSE SERPL-MCNC: 87 MG/DL (ref 65–99)
GLUCOSE UR QL: NEGATIVE
HCT VFR BLD AUTO: 43.3 % (ref 34–46.6)
HDLC SERPL-MCNC: 51 MG/DL
HGB BLD-MCNC: 13.9 G/DL (ref 11.1–15.9)
HGB UR QL STRIP: NEGATIVE
IMM GRANULOCYTES # BLD AUTO: 0 X10E3/UL (ref 0–0.1)
IMM GRANULOCYTES NFR BLD AUTO: 0 %
KETONES UR QL STRIP: NEGATIVE
LDLC SERPL CALC-MCNC: 122 MG/DL (ref 0–99)
LEUKOCYTE ESTERASE UR QL STRIP: ABNORMAL
LYMPHOCYTES # BLD AUTO: 2.1 X10E3/UL (ref 0.7–3.1)
LYMPHOCYTES NFR BLD AUTO: 40 %
MCH RBC QN AUTO: 29.9 PG (ref 26.6–33)
MCHC RBC AUTO-ENTMCNC: 32.1 G/DL (ref 31.5–35.7)
MCV RBC AUTO: 93 FL (ref 79–97)
MICRO URNS: ABNORMAL
MONOCYTES # BLD AUTO: 0.4 X10E3/UL (ref 0.1–0.9)
MONOCYTES NFR BLD AUTO: 7 %
MUCOUS THREADS URNS QL MICRO: PRESENT
NEUTROPHILS # BLD AUTO: 2.5 X10E3/UL (ref 1.4–7)
NEUTROPHILS NFR BLD AUTO: 48 %
NITRITE UR QL STRIP: NEGATIVE
PH UR STRIP: 8.5 [PH] (ref 5–7.5)
PLATELET # BLD AUTO: 234 X10E3/UL (ref 150–379)
POTASSIUM SERPL-SCNC: 4.7 MMOL/L (ref 3.5–5.2)
PROT SERPL-MCNC: 7.3 G/DL (ref 6–8.5)
PROT UR QL STRIP: ABNORMAL
RBC # BLD AUTO: 4.65 X10E6/UL (ref 3.77–5.28)
RBC #/AREA URNS HPF: NORMAL /HPF
SODIUM SERPL-SCNC: 143 MMOL/L (ref 134–144)
SP GR UR: 1.02 (ref 1–1.03)
TRIGL SERPL-MCNC: 146 MG/DL (ref 0–149)
TSH SERPL DL<=0.005 MIU/L-ACNC: 0.27 UIU/ML (ref 0.45–4.5)
UROBILINOGEN UR STRIP-MCNC: 1 MG/DL (ref 0.2–1)
VLDLC SERPL CALC-MCNC: 29 MG/DL (ref 5–40)
WBC # BLD AUTO: 5.3 X10E3/UL (ref 3.4–10.8)
WBC #/AREA URNS HPF: NORMAL /HPF

## 2019-03-08 NOTE — PROGRESS NOTES
Add on thyroglobulin, Add on thyroid peroxidase antibody.  Dx= abnormal thyroid function , also thyroid stimulating antibody- same dx for all

## 2019-03-14 ENCOUNTER — TELEPHONE (OUTPATIENT)
Dept: INTERNAL MEDICINE CLINIC | Age: 69
End: 2019-03-14

## 2019-03-14 NOTE — TELEPHONE ENCOUNTER
Placed an outgoing call to patient's pharmacy to request vaccination history. Spoke to the pharmacist who stated that patient received the following vaccines at their pharmacy:    CMPIGBB-64: 8/4/18  Tdap: 8/4/18  Fluzone HD: 10/31/18    I have updated the immunization section with this information.     Carina Negron, PharmD, Fab[facility] Patter

## 2019-03-15 LAB
SPECIMEN STATUS REPORT, ROLRST: NORMAL
THYROGLOB AB SERPL-ACNC: <1 IU/ML (ref 0–0.9)
THYROPEROXIDASE AB SERPL-ACNC: 12 IU/ML (ref 0–34)

## 2019-03-19 NOTE — PROGRESS NOTES
Call- Labs look great overall except for a trend towards overactive thyroid. Repeat tsh, free t4 and free t3 in 2 months . Lab only, mail form.  Dx is abnormal thyroid lab

## 2019-03-20 DIAGNOSIS — R79.89 ABNORMAL THYROID BLOOD TEST: Primary | ICD-10-CM

## 2019-03-20 NOTE — PROGRESS NOTES
Advised pt her labs look great overall except for a trend towards overactive thyroid. Repeat tsh, free t4 and free t3 in 2 months. Lab only - she will  lab slip at . Lab slip printed.

## 2019-03-22 ENCOUNTER — HOSPITAL ENCOUNTER (OUTPATIENT)
Dept: LAB | Age: 69
Discharge: HOME | End: 2019-03-22
Payer: MEDICARE

## 2019-03-22 PROCEDURE — 82270 OCCULT BLOOD FECES: CPT

## 2019-03-27 LAB — HEMOCCULT STL QL IA: NEGATIVE

## 2019-06-11 ENCOUNTER — HOSPITAL ENCOUNTER (OUTPATIENT)
Dept: LAB | Age: 69
Discharge: HOME | End: 2019-06-11
Payer: MEDICARE

## 2019-06-11 PROCEDURE — 84481 FREE ASSAY (FT-3): CPT

## 2019-06-11 PROCEDURE — 84443 ASSAY THYROID STIM HORMONE: CPT

## 2019-06-11 PROCEDURE — 36415 COLL VENOUS BLD VENIPUNCTURE: CPT

## 2019-06-11 PROCEDURE — 84439 ASSAY OF FREE THYROXINE: CPT

## 2019-06-12 LAB
T3FREE SERPL-MCNC: 2.4 PG/ML (ref 2–4.4)
T4 FREE SERPL-MCNC: 1.13 NG/DL (ref 0.82–1.77)
TSH SERPL DL<=0.005 MIU/L-ACNC: 2.8 UIU/ML (ref 0.45–4.5)

## 2019-06-20 ENCOUNTER — OFFICE VISIT (OUTPATIENT)
Dept: INTERNAL MEDICINE CLINIC | Age: 69
End: 2019-06-20

## 2019-06-20 VITALS
SYSTOLIC BLOOD PRESSURE: 110 MMHG | WEIGHT: 139.2 LBS | TEMPERATURE: 98.5 F | RESPIRATION RATE: 16 BRPM | HEIGHT: 65 IN | BODY MASS INDEX: 23.19 KG/M2 | DIASTOLIC BLOOD PRESSURE: 68 MMHG | OXYGEN SATURATION: 96 % | HEART RATE: 73 BPM

## 2019-06-20 DIAGNOSIS — J06.9 VIRAL URI: Primary | ICD-10-CM

## 2019-06-20 NOTE — PROGRESS NOTES
HISTORY OF PRESENT ILLNESS  Tahir Love is a 81 y.o. female. URI    The history is provided by the patient. This is a new problem. Episode onset: 3 d. The problem has not changed since onset. Patient reports a subjective fever - was not measured. The fever has been present for less than 1 day. Associated symptoms include cough. Pertinent negatives include no chest pain, no abdominal pain, no diarrhea, no nausea, no congestion and no sore throat. She has tried NSAIDs for the symptoms. The treatment provided mild relief. Review of Systems   HENT: Negative for congestion and sore throat. Respiratory: Positive for cough. Negative for sputum production. Cardiovascular: Negative for chest pain. Gastrointestinal: Negative for abdominal pain, diarrhea and nausea. Musculoskeletal: Positive for back pain. Physical Exam   Constitutional: No distress. HENT:   Right Ear: Tympanic membrane and ear canal normal.   Left Ear: Tympanic membrane and ear canal normal.   Nose: Right sinus exhibits no maxillary sinus tenderness and no frontal sinus tenderness. Left sinus exhibits no maxillary sinus tenderness and no frontal sinus tenderness. Mouth/Throat: Posterior oropharyngeal erythema present. No oropharyngeal exudate or posterior oropharyngeal edema. Eyes: Conjunctivae are normal. Right eye exhibits no discharge. Left eye exhibits no discharge. Neck: Neck supple. Cardiovascular: Normal rate and regular rhythm. Exam reveals no gallop and no friction rub. No murmur heard. Pulmonary/Chest: Effort normal and breath sounds normal.   Lymphadenopathy:     She has no cervical adenopathy. Nursing note and vitals reviewed. ASSESSMENT and PLAN  Diagnoses and all orders for this visit:    1.  Viral URI    - Dayquil severe and Nyquil nighttime  as directed , call is sx persist

## 2019-06-20 NOTE — PROGRESS NOTES
Chief Complaint   Patient presents with    Cough     dry cough     1. Have you been to the ER, urgent care clinic since your last visit? Hospitalized since your last visit? No    2. Have you seen or consulted any other health care providers outside of the [address] [place] [address] since your last visit? Include any pap smears or colon screening.  No

## 2019-06-20 NOTE — PATIENT INSTRUCTIONS
Viral Respiratory Infection: Care Instructions Your Care Instructions Viruses are very small organisms. They grow in number after they enter your body. There are many types that cause different illnesses, such as colds and the mumps. The symptoms of a viral respiratory infection often start quickly. They include a fever, sore throat, and runny nose. You may also just not feel well. Or you may not want to eat much. Most viral respiratory infections are not serious. They usually get better with time and self-care. Antibiotics are not used to treat a viral infection. That's because antibiotics will not help cure a viral illness. In some cases, antiviral medicine can help your body fight a serious viral infection. Follow-up care is a key part of your treatment and safety. Be sure to make and go to all appointments, and call your doctor if you are having problems. It's also a good idea to know your test results and keep a list of the medicines you take. How can you care for yourself at home? · Rest as much as possible until you feel better. · Be safe with medicines. Take your medicine exactly as prescribed. Call your doctor if you think you are having a problem with your medicine. You will get more details on the specific medicine your doctor prescribes. · Take an over-the-counter pain medicine, such as acetaminophen (Tylenol), ibuprofen (Advil, Motrin), or naproxen (Aleve), as needed for pain and fever. Read and follow all instructions on the label. Do not give aspirin to anyone younger than 20. It has been linked to Reye syndrome, a serious illness. · Drink plenty of fluids, enough so that your urine is light yellow or clear like water. Hot fluids, such as tea or soup, may help relieve congestion in your nose and throat. If you have kidney, heart, or liver disease and have to limit fluids, talk with your doctor before you increase the amount of fluids you drink. · Try to clear mucus from your lungs by breathing deeply and coughing. · Gargle with warm salt water once an hour. This can help reduce swelling and throat pain. Use 1 teaspoon of salt mixed in 1 cup of warm water. · Do not smoke or allow others to smoke around you. If you need help quitting, talk to your doctor about stop-smoking programs and medicines. These can increase your chances of quitting for good. To avoid spreading the virus · Cough or sneeze into a tissue. Then throw the tissue away. · If you don't have a tissue, use your hand to cover your cough or sneeze. Then clean your hand. You can also cough into your sleeve. · Wash your hands often. Use soap and warm water. Wash for 15 to 20 seconds each time. · If you don't have soap and water near you, you can clean your hands with alcohol wipes or gel. When should you call for help? Call your doctor now or seek immediate medical care if: 
  · You have a new or higher fever.  
  · Your fever lasts more than 48 hours.  
  · You have trouble breathing.  
  · You have a fever with a stiff neck or a severe headache.  
  · You are sensitive to light.  
  · You feel very sleepy or confused.  
 Watch closely for changes in your health, and be sure to contact your doctor if: 
  · You do not get better as expected. Where can you learn more? Go to http://samanta-andrew.info/. Enter K565 in the search box to learn more about \"Viral Respiratory Infection: Care Instructions. \" Current as of: September 5, 2018 Content Version: 11.9 © 9613-2054 [employer]. Care instructions adapted under license by [employer]Ã¢â€[employer]Â¢ (which disclaims liability or warranty for this information). If you have questions about a medical condition or this instruction, always ask your healthcare professional. [address] any warranty or liability for your use of this information.

## 2019-06-22 ENCOUNTER — TELEPHONE (OUTPATIENT)
Dept: INTERNAL MEDICINE CLINIC | Age: 69
End: 2019-06-22

## 2019-06-22 NOTE — TELEPHONE ENCOUNTER
Oncall: seen 2 days ago by her PCP for URI. Calls stating fever 100.8 today and feels congested, sore throat and non productive cough. No SOB or wheezing. Counseled OTC meds for treatment of fever and symptoms. Reviewed likely viral. If not improving over next 24 hours will call back or go to urgent care.

## 2019-06-25 ENCOUNTER — TELEPHONE (OUTPATIENT)
Dept: INTERNAL MEDICINE CLINIC | Age: 69
End: 2019-06-25

## 2019-06-25 NOTE — TELEPHONE ENCOUNTER
Pt is still congested and would like a call to discuss changing medication or if she should continue with what she is taking.

## 2019-06-26 RX ORDER — CEFUROXIME AXETIL 250 MG/1
250 TABLET ORAL 2 TIMES DAILY
Qty: 14 TAB | Refills: 0 | Status: SHIPPED | OUTPATIENT
Start: 2019-06-26 | End: 2019-07-03

## 2019-06-26 NOTE — TELEPHONE ENCOUNTER
Advised pt MD recommends she continue with current regimen but she needs an antibiotic at this point. Send in ceftin 250 mg bid x 7 days. Will need to follow up if sx persist. Rx sent to pharmacy. Advised her to call mid week to update how she is feeling.

## 2019-06-26 NOTE — TELEPHONE ENCOUNTER
Spoke with pt - seen back on 6/20/19. Has been taking Dayquil and Nyquil as MD recommended. Ran fever 100.8 over weekend. Not had any fever since. Continues with chest congestion and cough is productive. Wanted to know if MD thinks she should be on something stronger?  Will forward to MD.

## 2019-07-03 ENCOUNTER — TELEPHONE (OUTPATIENT)
Dept: INTERNAL MEDICINE CLINIC | Age: 69
End: 2019-07-03

## 2019-07-03 NOTE — TELEPHONE ENCOUNTER
Antione Michelle (Self) [phone] (H)     Pt says that she was told to call back in about a week to let you know how she was doing.

## 2019-07-03 NOTE — TELEPHONE ENCOUNTER
Spoke with pt - states after a round of antibiotics feels a little better but continues with cough and hoarseness. She is going out of town next week. Scheduled appt on 7/8/19 at 9:35 am for fuv.

## 2019-07-08 ENCOUNTER — OFFICE VISIT (OUTPATIENT)
Dept: INTERNAL MEDICINE CLINIC | Age: 69
End: 2019-07-08

## 2019-07-08 ENCOUNTER — HOSPITAL ENCOUNTER (OUTPATIENT)
Dept: GENERAL RADIOLOGY | Age: 69
Discharge: HOME | End: 2019-07-08
Attending: INTERNAL MEDICINE
Payer: MEDICARE

## 2019-07-08 VITALS
OXYGEN SATURATION: 97 % | SYSTOLIC BLOOD PRESSURE: 112 MMHG | HEART RATE: 70 BPM | TEMPERATURE: 98.2 F | HEIGHT: 65 IN | WEIGHT: 135.2 LBS | BODY MASS INDEX: 22.53 KG/M2 | RESPIRATION RATE: 16 BRPM | DIASTOLIC BLOOD PRESSURE: 78 MMHG

## 2019-07-08 DIAGNOSIS — R10.13 DYSPEPSIA: ICD-10-CM

## 2019-07-08 DIAGNOSIS — R05.8 POST-VIRAL COUGH SYNDROME: ICD-10-CM

## 2019-07-08 DIAGNOSIS — R05.8 POST-VIRAL COUGH SYNDROME: Primary | ICD-10-CM

## 2019-07-08 DIAGNOSIS — J06.9 VIRAL URI: ICD-10-CM

## 2019-07-08 PROCEDURE — 71046 X-RAY EXAM CHEST 2 VIEWS: CPT

## 2019-07-08 RX ORDER — PREDNISONE 10 MG/1
TABLET ORAL
Qty: 30 TAB | Refills: 0 | Status: SHIPPED | OUTPATIENT
Start: 2019-07-08 | End: 2019-07-20

## 2019-07-08 RX ORDER — DOXYCYCLINE 100 MG/1
100 CAPSULE ORAL 2 TIMES DAILY
Qty: 14 CAP | Refills: 0 | Status: SHIPPED | OUTPATIENT
Start: 2019-07-08 | End: 2019-07-15

## 2019-07-08 RX ORDER — ONDANSETRON 4 MG/1
4 TABLET, ORALLY DISINTEGRATING ORAL
Qty: 10 TAB | Refills: 0 | Status: SHIPPED | OUTPATIENT
Start: 2019-07-08 | End: 2020-07-27

## 2019-07-08 NOTE — PROGRESS NOTES
HISTORY OF PRESENT ILLNESS  Lucinda Carlton is a 81 y.o. female. Wheezing    The history is provided by the patient (lingering dry cough post uri, overall feels better). This is a new problem. Episode onset: 3 wks. The problem occurs hourly. Associated symptoms include cough. Pertinent negatives include no fever and no sputum production. Precipitated by: Sydnie Browning. Treatments tried: ceftin. The treatment provided moderate relief. Her past medical history does not include asthma, COPD or chronic lung disease. Review of Systems   Constitutional: Negative for fever. Respiratory: Positive for cough and wheezing. Negative for sputum production. Gastrointestinal: Positive for nausea. Mild dyspepsia today, not atypical for her       Physical Exam   Constitutional: No distress. HENT:   Mouth/Throat: Oropharynx is clear and moist. No oropharyngeal exudate. Eyes: Right eye exhibits no discharge. Left eye exhibits no discharge. Cardiovascular: Normal rate and regular rhythm. Exam reveals no gallop and no friction rub. No murmur heard. Pulmonary/Chest: Effort normal. She has wheezes. Nursing note and vitals reviewed. ASSESSMENT and PLAN  Diagnoses and all orders for this visit:    1. Post-viral cough syndrome  -     XR CHEST PA LAT; Future  -     predniSONE (DELTASONE) 10 mg tablet; 4 tabs x 3 days, 3 tabs x 3 days, 2 tabs x 3 days, 1 tab x 3 days  -     doxycycline (VIBRAMYCIN) 100 mg capsule; Take 1 Cap by mouth two (2) times a day for 7 days. Start if prednisone isn't reducing cough    2. Viral URI- Call with recurrence      3. Dyspepsia  -     ondansetron (ZOFRAN ODT) 4 mg disintegrating tablet; Take 1 Tab by mouth every eight (8) hours as needed for Nausea.

## 2019-07-08 NOTE — PROGRESS NOTES
Identified pt with two pt identifiers(name and ). Reviewed record in preparation for visit and have obtained necessary documentation. All patient medications has been reviewed. Chief Complaint   Patient presents with    Cough     ongoing dry cough        Health Maintenance Due   Topic    Shingrix Vaccine Age 50> (1 of 2)    GLAUCOMA SCREENING Q2Y        Vitals:    19 0937   BP: 112/78   Pulse: 70   Resp: 16   Temp: 98.2 °F (36.8 °C)   TempSrc: Oral   SpO2: 97%   Weight: 135 lb 3.2 oz (61.3 kg)   Height: 5' 5\" (1.651 m)   PainSc:   0 - No pain       Coordination of Care Questionnaire:   1) Have you been to an emergency room, urgent care, or hospitalized since your last visit?   no       2. Have seen or consulted any other health care provider since your last visit?  NO

## 2019-12-08 DIAGNOSIS — F34.1 DYSTHYMIC DISORDER: ICD-10-CM

## 2019-12-09 RX ORDER — CITALOPRAM 20 MG/1
TABLET, FILM COATED ORAL
Qty: 180 TAB | Refills: 4 | Status: SHIPPED | OUTPATIENT
Start: 2019-12-09 | End: 2021-03-01 | Stop reason: SDUPTHER

## 2020-01-29 LAB — CREATININE, EXTERNAL: 0.8

## 2020-03-05 ENCOUNTER — TELEPHONE (OUTPATIENT)
Dept: INTERNAL MEDICINE CLINIC | Age: 70
End: 2020-03-05

## 2020-03-05 NOTE — TELEPHONE ENCOUNTER
Patient has upcoming cpe on 3/24/20 and would like to get her labwork done beforehand. Please put labslip up front.

## 2020-03-06 DIAGNOSIS — Z00.00 WELLNESS EXAMINATION: Primary | ICD-10-CM

## 2020-03-06 DIAGNOSIS — E78.00 ELEVATED LDL CHOLESTEROL LEVEL: ICD-10-CM

## 2020-03-23 ENCOUNTER — TELEPHONE (OUTPATIENT)
Dept: INTERNAL MEDICINE CLINIC | Age: 70
End: 2020-03-23

## 2020-03-23 NOTE — TELEPHONE ENCOUNTER
Spoke with pt about rescheduling her appt tomorrow. She called this am and canceled. She will call back to reschedule.

## 2020-07-17 ENCOUNTER — HOSPITAL ENCOUNTER (OUTPATIENT)
Dept: LAB | Age: 70
Discharge: HOME | End: 2020-07-17
Payer: MEDICARE

## 2020-07-17 PROCEDURE — 83550 IRON BINDING TEST: CPT

## 2020-07-18 LAB
APPEARANCE UR: CLEAR
BACTERIA #/AREA URNS HPF: NORMAL /[HPF]
BASOPHILS # BLD AUTO: 0 X10E3/UL (ref 0–0.2)
BASOPHILS NFR BLD AUTO: 1 %
BILIRUB UR QL STRIP: NEGATIVE
CASTS URNS QL MICRO: NORMAL /LPF
CHOLEST SERPL-MCNC: 150 MG/DL (ref 100–199)
COLOR UR: YELLOW
EOSINOPHIL # BLD AUTO: 0.2 X10E3/UL (ref 0–0.4)
EOSINOPHIL NFR BLD AUTO: 4 %
EPI CELLS #/AREA URNS HPF: NORMAL /HPF (ref 0–10)
ERYTHROCYTE [DISTWIDTH] IN BLOOD BY AUTOMATED COUNT: 15.2 % (ref 11.7–15.4)
GLUCOSE UR QL: NEGATIVE
HCT VFR BLD AUTO: 34.4 % (ref 34–46.6)
HDLC SERPL-MCNC: 55 MG/DL
HGB BLD-MCNC: 11 G/DL (ref 11.1–15.9)
HGB UR QL STRIP: NEGATIVE
IMM GRANULOCYTES # BLD AUTO: 0 X10E3/UL (ref 0–0.1)
IMM GRANULOCYTES NFR BLD AUTO: 0 %
KETONES UR QL STRIP: NEGATIVE
LDLC SERPL CALC-MCNC: 83 MG/DL (ref 0–99)
LEUKOCYTE ESTERASE UR QL STRIP: NEGATIVE
LYMPHOCYTES # BLD AUTO: 1.1 X10E3/UL (ref 0.7–3.1)
LYMPHOCYTES NFR BLD AUTO: 28 %
MCH RBC QN AUTO: 24.9 PG (ref 26.6–33)
MCHC RBC AUTO-ENTMCNC: 32 G/DL (ref 31.5–35.7)
MCV RBC AUTO: 78 FL (ref 79–97)
MICRO URNS: ABNORMAL
MICRO URNS: ABNORMAL
MONOCYTES # BLD AUTO: 0.6 X10E3/UL (ref 0.1–0.9)
MONOCYTES NFR BLD AUTO: 14 %
NEUTROPHILS # BLD AUTO: 2.1 X10E3/UL (ref 1.4–7)
NEUTROPHILS NFR BLD AUTO: 53 %
NITRITE UR QL STRIP: NEGATIVE
PH UR STRIP: 8 [PH] (ref 5–7.5)
PLATELET # BLD AUTO: 212 X10E3/UL (ref 150–450)
PROT UR QL STRIP: NEGATIVE
RBC # BLD AUTO: 4.41 X10E6/UL (ref 3.77–5.28)
RBC #/AREA URNS HPF: NORMAL /HPF (ref 0–2)
SP GR UR: 1.01 (ref 1–1.03)
T4 FREE SERPL-MCNC: 1.02 NG/DL (ref 0.82–1.77)
TRIGL SERPL-MCNC: 58 MG/DL (ref 0–149)
TSH SERPL DL<=0.005 MIU/L-ACNC: 3.15 UIU/ML (ref 0.45–4.5)
URINALYSIS REFLEX, 377202: ABNORMAL
UROBILINOGEN UR STRIP-MCNC: 0.2 MG/DL (ref 0.2–1)
VLDLC SERPL CALC-MCNC: 12 MG/DL (ref 5–40)
WBC # BLD AUTO: 3.9 X10E3/UL (ref 3.4–10.8)
WBC #/AREA URNS HPF: NORMAL /HPF (ref 0–5)

## 2020-07-24 LAB
IRON SATN MFR SERPL: 6 % (ref 15–55)
IRON SERPL-MCNC: 28 UG/DL (ref 27–139)
SPECIMEN STATUS REPORT, ROLRST: NORMAL
TIBC SERPL-MCNC: 435 UG/DL (ref 250–450)
UIBC SERPL-MCNC: 407 UG/DL (ref 118–369)

## 2020-07-27 ENCOUNTER — HOSPITAL ENCOUNTER (OUTPATIENT)
Dept: LAB | Age: 70
Discharge: HOME | End: 2020-07-27
Payer: MEDICARE

## 2020-07-27 ENCOUNTER — OFFICE VISIT (OUTPATIENT)
Dept: INTERNAL MEDICINE CLINIC | Age: 70
End: 2020-07-27

## 2020-07-27 VITALS
RESPIRATION RATE: 16 BRPM | TEMPERATURE: 98.2 F | WEIGHT: 141.2 LBS | HEIGHT: 65 IN | SYSTOLIC BLOOD PRESSURE: 115 MMHG | OXYGEN SATURATION: 98 % | HEART RATE: 69 BPM | DIASTOLIC BLOOD PRESSURE: 73 MMHG | BODY MASS INDEX: 23.53 KG/M2

## 2020-07-27 DIAGNOSIS — Z12.11 SCREEN FOR COLON CANCER: ICD-10-CM

## 2020-07-27 DIAGNOSIS — E53.8 B12 DEFICIENCY: ICD-10-CM

## 2020-07-27 DIAGNOSIS — Z00.00 MEDICARE ANNUAL WELLNESS VISIT, SUBSEQUENT: Primary | ICD-10-CM

## 2020-07-27 DIAGNOSIS — Z13.31 SCREENING FOR DEPRESSION: ICD-10-CM

## 2020-07-27 DIAGNOSIS — Z87.891 HISTORY OF TOBACCO USE: ICD-10-CM

## 2020-07-27 DIAGNOSIS — Z81.2 FAMILY HISTORY OF TOBACCO USE: ICD-10-CM

## 2020-07-27 DIAGNOSIS — D50.9 IRON DEFICIENCY ANEMIA, UNSPECIFIED IRON DEFICIENCY ANEMIA TYPE: ICD-10-CM

## 2020-07-27 DIAGNOSIS — F34.1 DYSTHYMIC DISORDER: ICD-10-CM

## 2020-07-27 PROCEDURE — 82607 VITAMIN B-12: CPT

## 2020-07-27 PROCEDURE — 82728 ASSAY OF FERRITIN: CPT

## 2020-07-27 PROCEDURE — 85025 COMPLETE CBC W/AUTO DIFF WBC: CPT

## 2020-07-27 PROCEDURE — 36415 COLL VENOUS BLD VENIPUNCTURE: CPT

## 2020-07-27 RX ORDER — LANOLIN ALCOHOL/MO/W.PET/CERES
325 CREAM (GRAM) TOPICAL
Qty: 30 TAB | Refills: 3
Start: 2020-07-27

## 2020-07-27 NOTE — PATIENT INSTRUCTIONS
Medicare Wellness Visit, Female The best way to live healthy is to have a lifestyle where you eat a well-balanced diet, exercise regularly, limit alcohol use, and quit all forms of tobacco/nicotine, if applicable. Regular preventive services are another way to keep healthy. Preventive services (vaccines, screening tests, monitoring & exams) can help personalize your care plan, which helps you manage your own care. Screening tests can find health problems at the earliest stages, when they are easiest to treat. Ronitana rosa follows the current, evidence-based guidelines published by the [facility] Augie Wood ([facility]) when recommending preventive services for our patients. Because we follow these guidelines, sometimes recommendations change over time as research supports it. (For example, mammograms used to be recommended annually. Even though Medicare will still pay for an annual mammogram, the newer guidelines recommend a mammogram every two years for women of average risk). Of course, you and your doctor may decide to screen more often for some diseases, based on your risk and your co-morbidities (chronic disease you are already diagnosed with). Preventive services for you include: - Medicare offers their members a free annual wellness visit, which is time for you and your primary care provider to discuss and plan for your preventive service needs. Take advantage of this benefit every year! 
-All adults over the age of 72 should receive the recommended pneumonia vaccines. Current USPSTF guidelines recommend a series of two vaccines for the best pneumonia protection.  
-All adults should have a flu vaccine yearly and a tetanus vaccine every 10 years.  
-All adults age 48 and older should receive the shingles vaccines (series of two vaccines). -All adults age 38-68 who are overweight should have a diabetes screening test once every three years. -All adults born between [birth year] and 1963 should be screened once for Hepatitis C. 
-Other screening tests and preventive services for persons with diabetes include: an eye exam to screen for diabetic retinopathy, a kidney function test, a foot exam, and stricter control over your cholesterol.  
-Cardiovascular screening for adults with routine risk involves an electrocardiogram (ECG) at intervals determined by your doctor.  
-Colorectal cancer screenings should be done for adults age 54-65 with no increased risk factors for colorectal cancer. There are a number of acceptable methods of screening for this type of cancer. Each test has its own benefits and drawbacks. Discuss with your doctor what is most appropriate for you during your annual wellness visit. The different tests include: colonoscopy (considered the best screening method), a fecal occult blood test, a fecal DNA test, and sigmoidoscopy. 
 
-A bone mass density test is recommended when a woman turns 65 to screen for osteoporosis. This test is only recommended one time, as a screening. Some providers will use this same test as a disease monitoring tool if you already have osteoporosis. -Breast cancer screenings are recommended every other year for women of normal risk, age 54-69. 
-Cervical cancer screenings for women over age 72 are only recommended with certain risk factors. Here is a list of your current Health Maintenance items (your personalized list of preventive services) with a due date: 
Health Maintenance Due Topic Date Due  Shingles Vaccine (1 of 2) 04/24/2002  Glaucoma Screening   03/27/2019  Pneumococcal Vaccine (2 of 2 - PPSV23) 08/04/2019 [employer] Annual Well Visit  03/07/2020

## 2020-07-27 NOTE — PROGRESS NOTES
HISTORY OF PRESENT ILLNESS  Chinita Harada is a 78 y.o. female. HPI Subjective:  Sher Segal is seen today for a Medicare wellness visit and follow up of chronic problems. 1. Wellness visit. See attached note. See assessment and plan for full review. She is up to date with a colonoscopy in 2016 and labs, which I fully reviewed with her. She has had the Pneumovax, but not the shingles vaccine. 2. New problem reviewed. She has anemia, which is iron deficient. She denies any rectal bleeding or other signs of bleeding. She has a history of anemia back in 1998 that was apparently difficult to treat with oral supplementation secondary to what she describes as \"poor absorption\". We will check stool for occult blood, repeat CBC to assure no dramatic worsening since her pre-visit labs, a ferritin and B12 level. We will go ahead and start iron supplementation. We will repeat a CBC in one month. 3. Chronic problems are reviewed. a. Dysthymia. She does well with Celexa.  b. Osteoporosis. She follows up with rheumatology. c. Chronic cough, seems to be a bit worse. We will need to follow this for now. Current Outpatient Medications   Medication Sig    ferrous sulfate 325 mg (65 mg iron) tablet Take 1 Tab by mouth Daily (before breakfast).  citalopram (CELEXA) 20 mg tablet TAKE TWO TABLETS BY MOUTH DAILY    triamcinolone (NASACORT) 55 mcg nasal inhaler 2 Sprays daily.  denosumab (PROLIA) 60 mg/mL injection 60 mg by SubCUTAneous route.  loratadine (CLARITIN) 10 mg tablet Take 10 mg by mouth daily as needed.  multivitamin,tx-iron-ca-min (THERAPEUTIC MULTIVIT/MINERAL) 27-0.4 mg Tab Take 1 Tab by mouth daily.  cholecalciferol, vitamin d3, (VITAMIN D) 1,000 unit tablet Take  by mouth daily.  CALCIUM CARBONATE/VITAMIN D3 (CALCIUM 600 + D PO) Take 2 Tabs by mouth daily. No current facility-administered medications for this visit.          Review of Systems   Constitutional: Negative for malaise/fatigue and weight loss. HENT: Positive for hearing loss. Negative for sore throat. Respiratory: Positive for cough. Cardiovascular: Negative for chest pain, palpitations, leg swelling and PND. Gastrointestinal: Negative for blood in stool and melena. Genitourinary: Positive for frequency and urgency. Negative for dysuria. Musculoskeletal: Positive for joint pain. Negative for myalgias. Neurological: Negative for focal weakness. Endo/Heme/Allergies: Does not bruise/bleed easily. Physical Exam  Vitals signs and nursing note reviewed. Constitutional:       General: She is not in acute distress. Appearance: She is well-developed. HENT:      Head: Normocephalic and atraumatic. Right Ear: Tympanic membrane, ear canal and external ear normal.      Left Ear: Tympanic membrane, ear canal and external ear normal.   Eyes:      General:         Right eye: No discharge. Left eye: No discharge. Pupils: Pupils are equal, round, and reactive to light. Neck:      Musculoskeletal: Normal range of motion and neck supple. Thyroid: No thyromegaly. Vascular: No carotid bruit. Cardiovascular:      Rate and Rhythm: Normal rate and regular rhythm. Heart sounds: Normal heart sounds. No murmur. No friction rub. No gallop. Pulmonary:      Effort: Pulmonary effort is normal. No respiratory distress. Breath sounds: Normal breath sounds. No wheezing or rales. Abdominal:      General: Bowel sounds are normal. There is no distension. Palpations: Abdomen is soft. There is no mass. Tenderness: There is no abdominal tenderness. There is no guarding or rebound. Musculoskeletal: Normal range of motion. General: No tenderness. Lymphadenopathy:      Cervical: No cervical adenopathy. Skin:     General: Skin is warm and dry. Findings: No rash. Neurological:      Mental Status: She is alert and oriented to person, place, and time.       Deep Tendon Reflexes: Reflexes are normal and symmetric. Psychiatric:         Behavior: Behavior normal.         ASSESSMENT and PLAN  Diagnoses and all orders for this visit:    1. Medicare annual wellness visit, subsequent    2. Screening for depression  -     DEPRESSION SCREEN ANNUAL    3. Dysthymic disorder- Continue current regimen of prescription and / or OTC medications     4. Iron deficiency anemia, unspecified iron deficiency anemia type  -     CBC WITH AUTOMATED DIFF  -     FERRITIN  -     CBC WITH AUTOMATED DIFF  -     ferrous sulfate 325 mg (65 mg iron) tablet; Take 1 Tab by mouth Daily (before breakfast). 5. B12 deficiency  -     VITAMIN B12    6. Screen for colon cancer  -     OCCULT BLOOD IMMUNOASSAY,DIAGNOSTIC    7. Family history of tobacco use    8. History of tobacco use  -     CT LOW DOSE LUNG CANCER SCREENING;  Future

## 2020-07-27 NOTE — PROGRESS NOTES
This is the Subsequent Medicare Annual Wellness Exam, performed 12 months or more after the Initial AWV or the last Subsequent AWV    I have reviewed the patient's medical history in detail and updated the computerized patient record. History     Patient Active Problem List   Diagnosis Code    Iron deficiency anemia, unspecified D50.9    Symptomatic menopausal or female climacteric states N95.1    Benign neoplasm of colon D12.6    Dysthymic disorder F34.1    Allergic rhinitis, cause unspecified J30.9    Disorder of bone and cartilage M89.9, M94.9    Advance directive discussed with patient Z71.89    Mild depression ([address] F32.0     No past medical history on file. Past Surgical History:   Procedure Laterality Date    ENDOSCOPY, COLON, DIAGNOSTIC      11, due 16     Current Outpatient Medications   Medication Sig Dispense Refill    citalopram (CELEXA) 20 mg tablet TAKE TWO TABLETS BY MOUTH DAILY 180 Tab 4    triamcinolone (NASACORT) 55 mcg nasal inhaler 2 Sprays daily.  denosumab (PROLIA) 60 mg/mL injection 60 mg by SubCUTAneous route.  loratadine (CLARITIN) 10 mg tablet Take 10 mg by mouth daily as needed.  multivitamin,tx-iron-ca-min (THERAPEUTIC MULTIVIT/MINERAL) 27-0.4 mg Tab Take 1 Tab by mouth daily.  cholecalciferol, vitamin d3, (VITAMIN D) 1,000 unit tablet Take  by mouth daily.  CALCIUM CARBONATE/VITAMIN D3 (CALCIUM 600 + D PO) Take 2 Tabs by mouth daily.       varicella-zoster recombinant, PF, (SHINGRIX, PF,) 50 mcg/0.5 mL susr injection 0.5mL by IntraMUSCular route once now and then repeat in 2-6 months 0.5 mL 1     Allergies   Allergen Reactions    Benzocaine Rash     topical       Family History   Problem Relation Age of Onset    Hypertension Mother     Cancer Mother         breast, colon    Asthma Father     Heart Disease Father     Arthritis-rheumatoid Brother     Hypertension Brother     Other Brother         rare blood disorder     Social History Tobacco Use    Smoking status: Former Smoker     Packs/day: 1.50     Years: 19.00     Pack years: 28.50     Last attempt to quit: 1999     Years since quittin.5    Smokeless tobacco: Never Used   Substance Use Topics    Alcohol use: Yes     Alcohol/week: 5.0 standard drinks     Types: 2 Glasses of wine, 3 Standard drinks or equivalent per week       Depression Risk Factor Screening:     3 most recent PHQ Screens 2019   Little interest or pleasure in doing things Not at all   Feeling down, depressed, irritable, or hopeless Not at all   Total Score PHQ 2 0       Alcohol Risk Factor Screening:   Do you average 1 drink per night or more than 7 drinks a week:  No, 2 per wk    On any one occasion in the past three months have you have had more than 3 drinks containing alcohol:  No      Functional Ability and Level of Safety:   Hearing: Hearing is good. Activities of Daily Living: The home contains: no safety equipment. Patient does total self care     Ambulation: with no difficulty     Fall Risk:  Fall Risk Assessment, last 12 mths 2020   Able to walk? Yes   Fall in past 12 months? No     Abuse Screen:  Patient is not abused       Cognitive Screening   Has your family/caregiver stated any concerns about your memory: no         Patient Care Team   Patient Care Team:  Jemima Chong MD as PCP - General  Jemima Chong MD as PCP - [facility] Empaneled Provider    Assessment/Plan   Education and counseling provided:  Are appropriate based on today's review and evaluation    Diagnoses and all orders for this visit:    1. Medicare annual wellness visit, subsequent    2.  Screening for depression  -     [place] Maintenance Due   Topic Date Due    Shingrix Vaccine Age 50> (1 of 2) 2002    GLAUCOMA SCREENING Q2Y  2019    Pneumococcal 65+ years (2 of 2 - PPSV23) 2019    Medicare Yearly Exam  2020

## 2020-07-28 ENCOUNTER — HOSPITAL ENCOUNTER (OUTPATIENT)
Dept: LAB | Age: 70
Discharge: HOME | End: 2020-07-28
Payer: MEDICARE

## 2020-07-28 LAB
BASOPHILS # BLD AUTO: 0 X10E3/UL (ref 0–0.2)
BASOPHILS NFR BLD AUTO: 1 %
EOSINOPHIL # BLD AUTO: 0.1 X10E3/UL (ref 0–0.4)
EOSINOPHIL NFR BLD AUTO: 2 %
ERYTHROCYTE [DISTWIDTH] IN BLOOD BY AUTOMATED COUNT: 15 % (ref 11.7–15.4)
FERRITIN SERPL-MCNC: 8 NG/ML (ref 15–150)
HCT VFR BLD AUTO: 33.5 % (ref 34–46.6)
HGB BLD-MCNC: 10.6 G/DL (ref 11.1–15.9)
IMM GRANULOCYTES # BLD AUTO: 0 X10E3/UL (ref 0–0.1)
IMM GRANULOCYTES NFR BLD AUTO: 0 %
LYMPHOCYTES # BLD AUTO: 1.4 X10E3/UL (ref 0.7–3.1)
LYMPHOCYTES NFR BLD AUTO: 27 %
MCH RBC QN AUTO: 24.7 PG (ref 26.6–33)
MCHC RBC AUTO-ENTMCNC: 31.6 G/DL (ref 31.5–35.7)
MCV RBC AUTO: 78 FL (ref 79–97)
MONOCYTES # BLD AUTO: 0.8 X10E3/UL (ref 0.1–0.9)
MONOCYTES NFR BLD AUTO: 15 %
NEUTROPHILS # BLD AUTO: 3 X10E3/UL (ref 1.4–7)
NEUTROPHILS NFR BLD AUTO: 55 %
PLATELET # BLD AUTO: 253 X10E3/UL (ref 150–450)
RBC # BLD AUTO: 4.3 X10E6/UL (ref 3.77–5.28)
VIT B12 SERPL-MCNC: 808 PG/ML (ref 232–1245)
WBC # BLD AUTO: 5.4 X10E3/UL (ref 3.4–10.8)

## 2020-07-28 PROCEDURE — 82270 OCCULT BLOOD FECES: CPT

## 2020-07-30 ENCOUNTER — TELEPHONE (OUTPATIENT)
Dept: INTERNAL MEDICINE CLINIC | Age: 70
End: 2020-07-30

## 2020-07-30 NOTE — PROGRESS NOTES
Call- labs show anemia with low iron storage.  Continue with iron, kaylie cbc one month as discussed

## 2020-07-30 NOTE — PROGRESS NOTES
Advised pt her labs show anemia with low iron storage.  Continue with iron supplement and kaylie cbc one month as discussed with MD.

## 2020-07-31 LAB — HEMOCCULT STL QL IA: NEGATIVE

## 2020-08-04 DIAGNOSIS — E78.00 ELEVATED LDL CHOLESTEROL LEVEL: Primary | ICD-10-CM

## 2020-08-06 ENCOUNTER — HOSPITAL ENCOUNTER (OUTPATIENT)
Dept: CT IMAGING | Age: 70
Discharge: HOME | End: 2020-08-06
Attending: INTERNAL MEDICINE
Payer: MEDICARE

## 2020-08-06 DIAGNOSIS — Z87.891 HISTORY OF TOBACCO USE: ICD-10-CM

## 2020-08-06 PROCEDURE — G0297 LDCT FOR LUNG CA SCREEN: HCPCS

## 2020-08-10 ENCOUNTER — DOCUMENTATION ONLY (OUTPATIENT)
Dept: INTERNAL MEDICINE CLINIC | Age: 70
End: 2020-08-10

## 2020-08-10 ENCOUNTER — TELEPHONE (OUTPATIENT)
Dept: INTERNAL MEDICINE CLINIC | Age: 70
End: 2020-08-10

## 2020-08-10 NOTE — TELEPHONE ENCOUNTER
Patient called to schedule a telemed, per Dr. Annetta Ambriz instruction. How soon should we schedule and where would you like me to schedule her?

## 2020-08-10 NOTE — PROGRESS NOTES
Immunization History    Asked to update the patient immunization section with information from their pharmacy and the [employer] ([facility]) for pneumovax. Reviewed patient's chart and media section contains immunization consent form for pneumovax on 3/7/19. The immunization section has been updated with the above information.     Su Forrest, ShadiaD, BCACP      CLINICAL PHARMACY CONSULT: MED RECONCILIATION/REVIEW ADDENDUM    For Pharmacy Admin Tracking Only    PHSO: [facility] Patient?: Yes  Time Spent (min): 15    SHADIA FordeD, BCACP

## 2020-08-10 NOTE — TELEPHONE ENCOUNTER
Sridhar Olson asked Kush Ahmadi. She had sent to MD and he answered her. I sent it back to her this am. Thanks.

## 2020-08-14 ENCOUNTER — VIRTUAL VISIT (OUTPATIENT)
Dept: INTERNAL MEDICINE CLINIC | Age: 70
End: 2020-08-14
Payer: MEDICARE

## 2020-08-14 DIAGNOSIS — I25.84 CORONARY ARTERY CALCIFICATION: Primary | ICD-10-CM

## 2020-08-14 DIAGNOSIS — I77.810 ASCENDING AORTA DILATATION (HCC): ICD-10-CM

## 2020-08-14 DIAGNOSIS — I25.10 CORONARY ARTERY CALCIFICATION: Primary | ICD-10-CM

## 2020-08-14 DIAGNOSIS — R05.3 CHRONIC COUGH: ICD-10-CM

## 2020-08-14 PROCEDURE — 1101F PT FALLS ASSESS-DOCD LE1/YR: CPT | Performed by: INTERNAL MEDICINE

## 2020-08-14 PROCEDURE — G9717 DOC PT DX DEP/BP F/U NT REQ: HCPCS | Performed by: INTERNAL MEDICINE

## 2020-08-14 PROCEDURE — G9899 SCRN MAM PERF RSLTS DOC: HCPCS | Performed by: INTERNAL MEDICINE

## 2020-08-14 PROCEDURE — G8399 PT W/DXA RESULTS DOCUMENT: HCPCS | Performed by: INTERNAL MEDICINE

## 2020-08-14 PROCEDURE — G8428 CUR MEDS NOT DOCUMENT: HCPCS | Performed by: INTERNAL MEDICINE

## 2020-08-14 PROCEDURE — 1090F PRES/ABSN URINE INCON ASSESS: CPT | Performed by: INTERNAL MEDICINE

## 2020-08-14 PROCEDURE — 3017F COLORECTAL CA SCREEN DOC REV: CPT | Performed by: INTERNAL MEDICINE

## 2020-08-14 PROCEDURE — 99214 OFFICE O/P EST MOD 30 MIN: CPT | Performed by: INTERNAL MEDICINE

## 2020-08-14 RX ORDER — FLUTICASONE PROPIONATE 110 UG/1
2 AEROSOL, METERED RESPIRATORY (INHALATION) EVERY 12 HOURS
Qty: 1 INHALER | Refills: 5 | Status: SHIPPED | OUTPATIENT
Start: 2020-08-14 | End: 2021-08-11 | Stop reason: SDUPTHER

## 2020-08-14 NOTE — PROGRESS NOTES
Chief Complaint   Patient presents with    Results     CT Scan     1. Have you been to the ER, urgent care clinic since your last visit? Hospitalized since your last visit? No    2. Have you seen or consulted any other health care providers outside of the [address] [place] [address] since your last visit? Include any pap smears or colon screening.  No

## 2020-08-14 NOTE — PROGRESS NOTES
HISTORY OF PRESENT ILLNESS  Hi Seay is a 78 y.o. female. This is a real-time audio/video visit brought to you by our sponsors, the fine folks at [facility] and [employer]. [employer] platform   HPI Subjective:  Missy Ramos is seen today for follow up of CT scan for lung cancer screening. There were several incidental findings. She has other concerns as well. 1. History of tobacco use. Reviewed CT, no suspicious findings for malignancy. She needs an annual screening. 2. New problems reviewed, coronary artery calcification and dilated thoracic ascending aorta. These findings are new. We will refer for a cardiology evaluation. She understands that further testing likely will be required. Other than some dyspnea on exertion, she has no real cardiac symptoms. Further, this is complicated by the fact that she is having some anemia. See below. 3. Anemia. She is on iron supplementation with a pending recheck. 4. Chronic cough. Will give a trial to Singulair. Family History:  Reviewed. She has a maternal uncle who had an AAA. Otherwise no premature coronary disease. Review of Systems   Constitutional: Negative for chills and fever. Respiratory: Positive for cough and shortness of breath. With weight lifting       Physical Exam  Vitals signs and nursing note reviewed. Constitutional:       Appearance: She is not ill-appearing. Skin:     General: Skin is warm and dry. Neurological:      Mental Status: She is alert. Psychiatric:         Behavior: Behavior normal.         ASSESSMENT and PLAN  Diagnoses and all orders for this visit:    1. Coronary artery calcification- See cardiologist as directed. 2. Ascending aorta dilatation [facility])- See cardiologist as directed. 3. Chronic cough     Prefers inhaler therapy over oral treatmen  -     fluticasone propionate (FLOVENT HFA) 110 mcg/actuation inhaler; Take 2 Puffs by inhalation every twelve (12) hours.

## 2020-08-26 ENCOUNTER — OFFICE VISIT (OUTPATIENT)
Dept: CARDIOLOGY CLINIC | Age: 70
End: 2020-08-26
Payer: MEDICARE

## 2020-08-26 VITALS
BODY MASS INDEX: 23.66 KG/M2 | RESPIRATION RATE: 16 BRPM | DIASTOLIC BLOOD PRESSURE: 70 MMHG | SYSTOLIC BLOOD PRESSURE: 110 MMHG | HEART RATE: 68 BPM | HEIGHT: 65 IN | WEIGHT: 142 LBS | OXYGEN SATURATION: 98 %

## 2020-08-26 DIAGNOSIS — D50.8 OTHER IRON DEFICIENCY ANEMIA: ICD-10-CM

## 2020-08-26 DIAGNOSIS — I25.10 CORONARY ARTERY CALCIFICATION: ICD-10-CM

## 2020-08-26 DIAGNOSIS — Z87.891 HISTORY OF SMOKELESS TOBACCO USE: ICD-10-CM

## 2020-08-26 DIAGNOSIS — I25.84 CORONARY ARTERY CALCIFICATION: ICD-10-CM

## 2020-08-26 DIAGNOSIS — Z82.49 FAMILY HISTORY OF EARLY CAD: ICD-10-CM

## 2020-08-26 DIAGNOSIS — I20.0 UNSTABLE ANGINA (HCC): Primary | ICD-10-CM

## 2020-08-26 PROCEDURE — G8427 DOCREV CUR MEDS BY ELIG CLIN: HCPCS | Performed by: INTERNAL MEDICINE

## 2020-08-26 PROCEDURE — G9899 SCRN MAM PERF RSLTS DOC: HCPCS | Performed by: INTERNAL MEDICINE

## 2020-08-26 PROCEDURE — G8399 PT W/DXA RESULTS DOCUMENT: HCPCS | Performed by: INTERNAL MEDICINE

## 2020-08-26 PROCEDURE — G8536 NO DOC ELDER MAL SCRN: HCPCS | Performed by: INTERNAL MEDICINE

## 2020-08-26 PROCEDURE — 99204 OFFICE O/P NEW MOD 45 MIN: CPT | Performed by: INTERNAL MEDICINE

## 2020-08-26 PROCEDURE — 3017F COLORECTAL CA SCREEN DOC REV: CPT | Performed by: INTERNAL MEDICINE

## 2020-08-26 PROCEDURE — G8420 CALC BMI NORM PARAMETERS: HCPCS | Performed by: INTERNAL MEDICINE

## 2020-08-26 PROCEDURE — G0463 HOSPITAL OUTPT CLINIC VISIT: HCPCS | Performed by: INTERNAL MEDICINE

## 2020-08-26 PROCEDURE — 1090F PRES/ABSN URINE INCON ASSESS: CPT | Performed by: INTERNAL MEDICINE

## 2020-08-26 PROCEDURE — 1101F PT FALLS ASSESS-DOCD LE1/YR: CPT | Performed by: INTERNAL MEDICINE

## 2020-08-26 PROCEDURE — G9717 DOC PT DX DEP/BP F/U NT REQ: HCPCS | Performed by: INTERNAL MEDICINE

## 2020-08-26 RX ORDER — ROSUVASTATIN CALCIUM 20 MG/1
20 TABLET, COATED ORAL
Qty: 90 TAB | Refills: 1 | Status: SHIPPED | OUTPATIENT
Start: 2020-08-26 | End: 2021-02-22

## 2020-08-26 NOTE — PROGRESS NOTES
[place]: Prasad  [phone]    History of Present Illness:   Ms. Brayan Ochoa is a 79 yo F with history of tobacco use, quit several years ago, family history of early coronary artery disease, referred by Dr. Rosa Maria Bray for cardiac evaluation. She is here due to abnormal CT scan, as well as dyspnea on exertion. She saw her primary care physician and had been having chronic \"bronchial irritation\" and cough that had been progressive over the last few months. She does get shortness of breath if she goes up a hill. She does weightlifting on a regular basis. She also recently noted that she has anemia and was started on iron. She has had stress testing, though several years ago that was overall okay. She denies any exertional chest pains. No significant palpitations, lightheadedness or dizziness. She had a CT scan of the chest on 08/06/2020 that noted no suspicious pulmonary nodules, but did note a calcified granuloma in the right upper lobe. Incidentally it noted an ascending thoracic aorta that was dilated at 4.2 x 4.1 cm, as well as \"extensive coronary calcifications\". We discussed the results. She is compensated on exam with clear lungs and no lower extremity edema, II/VI systolic murmur at the left sternal border. Soc hx. Quit tobacco 1999  Fam hx. Dad rheumatic fever and tobacco use, [facility]. CABG 63 yo. Assessment and Plan:    1. Unstable angina, coronary calcifications. Shortness of breath, possible anginal equivalent with risk factors; will proceed with a stress test and echocardiogram for further evaluation. She cannot fully complete a treadmill and will do this Lexiscan. 2. Cardiac murmur. Will obtain an echocardiogram for further evaluation. Likely she has some degree of aortic stenosis and may need surveillance. 3. Dilated ascending aorta. This was dilated at 4.2 x 4.1 cm by CT scan.   Will reimage this by echocardiogram.  If the measurements by echo agree with CT, will likely do surveillance echocardiograms once a year and then CT scans as needed to minimize radiation exposure and we discussed this at length. 4. Coronary artery calcifications. Recommended a statin, Crestor 20 mg once daily. She has a lot of reservations about statins and gave her a prescription and recommended she read and think about this more. Will have her follow up in one to two weeks after cardiac testing and can discuss this further. 5. History of tobacco use. Quit many years ago. 6. Family history of early coronary artery disease. She  has no past medical history on file. All other systems negative except as above. PE  Vitals:    08/26/20 1337   BP: 110/70   Pulse: 68   Resp: 16   SpO2: 98%   Weight: 142 lb (64.4 kg)   Height: 5' 5\" (1.651 m)    Body mass index is 23.63 kg/m².    General appearance - alert, well appearing, and in no distress  Mental status - affect appropriate to mood  Eyes - sclera anicteric, moist mucous membranes  Neck - supple, no JVD  Chest - clear to auscultation, no wheezes, rales or rhonchi  Heart - normal rate, regular rhythm, normal S1, S2, II/VI systolic murmur LUSB  Abdomen - soft, nontender, nondistended, no masses or organomegaly  Neurological -no focal deficit  Extremities - peripheral pulses normal, no pedal edema      Recent Labs:  Lab Results   Component Value Date/Time    Cholesterol, total 150 07/17/2020 08:14 AM    HDL Cholesterol 55 07/17/2020 08:14 AM    LDL, calculated 83 07/17/2020 08:14 AM    Triglyceride 58 07/17/2020 08:14 AM    CHOL/HDL Ratio 3.2 07/15/2010 08:35 AM     Lab Results   Component Value Date/Time    Creatinine 0.76 03/07/2019 10:41 AM     Lab Results   Component Value Date/Time    BUN 11 03/07/2019 10:41 AM     Lab Results   Component Value Date/Time    Potassium 4.7 03/07/2019 10:41 AM     No results found for: HBA1C, HGBE8, ZBG4AWUB, JQB3ULYO  Lab Results   Component Value Date/Time    HGB 10.6 (L) 07/27/2020 04:38 PM     Lab Results   Component Value Date/Time    PLATELET 613  04:38 PM       Reviewed:  No past medical history on file. Social History     Tobacco Use   Smoking Status Former Smoker    Packs/day: 1.50    Years: 19.00    Pack years: 28.50    Last attempt to quit: 1999    Years since quittin.6   Smokeless Tobacco Never Used     Social History     Substance and Sexual Activity   Alcohol Use Yes    Alcohol/week: 5.0 standard drinks    Types: 2 Glasses of wine, 3 Standard drinks or equivalent per week     Allergies   Allergen Reactions    Benzocaine Rash     topical       Current Outpatient Medications   Medication Sig    fluticasone propionate (FLOVENT HFA) 110 mcg/actuation inhaler Take 2 Puffs by inhalation every twelve (12) hours.  ferrous sulfate 325 mg (65 mg iron) tablet Take 1 Tab by mouth Daily (before breakfast).  citalopram (CELEXA) 20 mg tablet TAKE TWO TABLETS BY MOUTH DAILY    triamcinolone (NASACORT) 55 mcg nasal inhaler 2 Sprays daily.  denosumab (PROLIA) 60 mg/mL injection 60 mg by SubCUTAneous route.  loratadine (CLARITIN) 10 mg tablet Take 10 mg by mouth daily as needed.  multivitamin,tx-iron-ca-min (THERAPEUTIC MULTIVIT/MINERAL) 27-0.4 mg Tab Take 1 Tab by mouth daily.  cholecalciferol, vitamin d3, (VITAMIN D) 1,000 unit tablet Take  by mouth daily.  CALCIUM CARBONATE/VITAMIN D3 (CALCIUM 600 + D PO) Take 2 Tabs by mouth daily. No current facility-administered medications for this visit.         Cynthia Rice MD  [facility] heart and V[facility] [place]  [address] [facility] [address]  [facility], [address]

## 2020-08-27 ENCOUNTER — HOSPITAL ENCOUNTER (OUTPATIENT)
Dept: LAB | Age: 70
Discharge: HOME | End: 2020-08-27
Payer: MEDICARE

## 2020-08-27 PROCEDURE — 36415 COLL VENOUS BLD VENIPUNCTURE: CPT

## 2020-08-27 PROCEDURE — 80053 COMPREHEN METABOLIC PANEL: CPT

## 2020-08-27 PROCEDURE — 85025 COMPLETE CBC W/AUTO DIFF WBC: CPT

## 2020-08-28 LAB
ALBUMIN SERPL-MCNC: 4.1 G/DL (ref 3.8–4.8)
ALBUMIN/GLOB SERPL: 2.2 {RATIO} (ref 1.2–2.2)
ALP SERPL-CCNC: 99 IU/L (ref 39–117)
ALT SERPL-CCNC: 27 IU/L (ref 0–32)
AST SERPL-CCNC: 33 IU/L (ref 0–40)
BASOPHILS # BLD AUTO: 0 X10E3/UL (ref 0–0.2)
BASOPHILS NFR BLD AUTO: 0 %
BILIRUB SERPL-MCNC: 0.4 MG/DL (ref 0–1.2)
BUN SERPL-MCNC: 11 MG/DL (ref 8–27)
BUN/CREAT SERPL: 15 (ref 12–28)
CALCIUM SERPL-MCNC: 9 MG/DL (ref 8.7–10.3)
CHLORIDE SERPL-SCNC: 102 MMOL/L (ref 96–106)
CO2 SERPL-SCNC: 24 MMOL/L (ref 20–29)
CREAT SERPL-MCNC: 0.73 MG/DL (ref 0.57–1)
EOSINOPHIL # BLD AUTO: 0.1 X10E3/UL (ref 0–0.4)
EOSINOPHIL NFR BLD AUTO: 3 %
ERYTHROCYTE [DISTWIDTH] IN BLOOD BY AUTOMATED COUNT: 14.7 % (ref 11.7–15.4)
GLOBULIN SER CALC-MCNC: 1.9 G/DL (ref 1.5–4.5)
GLUCOSE SERPL-MCNC: 92 MG/DL (ref 65–99)
HCT VFR BLD AUTO: 34.5 % (ref 34–46.6)
HGB BLD-MCNC: 11.1 G/DL (ref 11.1–15.9)
IMM GRANULOCYTES # BLD AUTO: 0 X10E3/UL (ref 0–0.1)
IMM GRANULOCYTES NFR BLD AUTO: 0 %
LYMPHOCYTES # BLD AUTO: 1.1 X10E3/UL (ref 0.7–3.1)
LYMPHOCYTES NFR BLD AUTO: 24 %
MCH RBC QN AUTO: 25.6 PG (ref 26.6–33)
MCHC RBC AUTO-ENTMCNC: 32.2 G/DL (ref 31.5–35.7)
MCV RBC AUTO: 80 FL (ref 79–97)
MONOCYTES # BLD AUTO: 0.6 X10E3/UL (ref 0.1–0.9)
MONOCYTES NFR BLD AUTO: 14 %
NEUTROPHILS # BLD AUTO: 2.7 X10E3/UL (ref 1.4–7)
NEUTROPHILS NFR BLD AUTO: 59 %
PLATELET # BLD AUTO: 226 X10E3/UL (ref 150–450)
POTASSIUM SERPL-SCNC: 4.5 MMOL/L (ref 3.5–5.2)
PROT SERPL-MCNC: 6 G/DL (ref 6–8.5)
RBC # BLD AUTO: 4.33 X10E6/UL (ref 3.77–5.28)
SODIUM SERPL-SCNC: 139 MMOL/L (ref 134–144)
WBC # BLD AUTO: 4.7 X10E3/UL (ref 3.4–10.8)

## 2020-09-11 ENCOUNTER — ANCILLARY PROCEDURE (OUTPATIENT)
Dept: CARDIOLOGY CLINIC | Age: 70
End: 2020-09-11
Payer: MEDICARE

## 2020-09-11 ENCOUNTER — TELEPHONE (OUTPATIENT)
Dept: CARDIOLOGY CLINIC | Age: 70
End: 2020-09-11

## 2020-09-11 VITALS — BODY MASS INDEX: 23.66 KG/M2 | HEIGHT: 65 IN | WEIGHT: 142 LBS

## 2020-09-11 VITALS
SYSTOLIC BLOOD PRESSURE: 122 MMHG | WEIGHT: 142 LBS | DIASTOLIC BLOOD PRESSURE: 72 MMHG | HEIGHT: 65 IN | BODY MASS INDEX: 23.66 KG/M2

## 2020-09-11 DIAGNOSIS — I20.0 UNSTABLE ANGINA (HCC): ICD-10-CM

## 2020-09-11 DIAGNOSIS — Z87.891 HISTORY OF SMOKELESS TOBACCO USE: ICD-10-CM

## 2020-09-11 DIAGNOSIS — I25.84 CORONARY ARTERY CALCIFICATION: ICD-10-CM

## 2020-09-11 DIAGNOSIS — I25.10 CORONARY ARTERY CALCIFICATION: ICD-10-CM

## 2020-09-11 DIAGNOSIS — D50.8 OTHER IRON DEFICIENCY ANEMIA: ICD-10-CM

## 2020-09-11 DIAGNOSIS — Z82.49 FAMILY HISTORY OF EARLY CAD: ICD-10-CM

## 2020-09-11 LAB
ECHO AO ASC DIAM: 3.85 CM
ECHO AO ROOT DIAM: 3.36 CM
ECHO AR MAX VEL PISA: 410.37 CM/S
ECHO AV AREA PEAK VELOCITY: 1.55 CM2
ECHO AV AREA VTI: 1.64 CM2
ECHO AV AREA/BSA PEAK VELOCITY: 0.9 CM2/M2
ECHO AV AREA/BSA VTI: 1 CM2/M2
ECHO AV MEAN GRADIENT: 7.14 MMHG
ECHO AV PEAK GRADIENT: 12.46 MMHG
ECHO AV PEAK VELOCITY: 176.46 CM/S
ECHO AV REGURGITANT PHT: 0.82 S
ECHO AV VTI: 35.07 CM
ECHO IVC PROX: 2.02 CM
ECHO LA AREA 4C: 16.04 CM2
ECHO LA MAJOR AXIS: 3.42 CM
ECHO LA MINOR AXIS: 2 CM
ECHO LA VOL 2C: 66.22 ML (ref 22–52)
ECHO LA VOL 4C: 43.33 ML (ref 22–52)
ECHO LA VOL BP: 62.32 ML (ref 22–52)
ECHO LA VOL/BSA BIPLANE: 36.44 ML/M2 (ref 16–28)
ECHO LA VOLUME INDEX A2C: 38.72 ML/M2 (ref 16–28)
ECHO LA VOLUME INDEX A4C: 25.34 ML/M2 (ref 16–28)
ECHO LV E' LATERAL VELOCITY: 5.65 CM/S
ECHO LV E' SEPTAL VELOCITY: 5.33 CM/S
ECHO LV EDV A2C: 83.07 ML
ECHO LV EDV A4C: 83.67 ML
ECHO LV EDV BP: 85.33 ML (ref 56–104)
ECHO LV EDV INDEX A4C: 48.9 ML/M2
ECHO LV EDV INDEX BP: 49.9 ML/M2
ECHO LV EDV NDEX A2C: 48.6 ML/M2
ECHO LV EJECTION FRACTION A2C: 64 PERCENT
ECHO LV EJECTION FRACTION A4C: 55 PERCENT
ECHO LV EJECTION FRACTION BIPLANE: 60.3 PERCENT (ref 55–100)
ECHO LV ESV A2C: 30.2 ML
ECHO LV ESV A4C: 37.72 ML
ECHO LV ESV BP: 33.84 ML (ref 19–49)
ECHO LV ESV INDEX A2C: 17.7 ML/M2
ECHO LV ESV INDEX A4C: 22.1 ML/M2
ECHO LV ESV INDEX BP: 19.8 ML/M2
ECHO LV INTERNAL DIMENSION DIASTOLIC: 4 CM (ref 3.9–5.3)
ECHO LV INTERNAL DIMENSION SYSTOLIC: 2.48 CM
ECHO LV IVSD: 1.32 CM (ref 0.6–0.9)
ECHO LV MASS 2D: 176.9 G (ref 67–162)
ECHO LV MASS INDEX 2D: 103.4 G/M2 (ref 43–95)
ECHO LV POSTERIOR WALL DIASTOLIC: 1.19 CM (ref 0.6–0.9)
ECHO LVOT DIAM: 1.66 CM
ECHO LVOT PEAK GRADIENT: 6.34 MMHG
ECHO LVOT PEAK VELOCITY: 125.85 CM/S
ECHO LVOT SV: 57.6 ML
ECHO LVOT VTI: 26.55 CM
ECHO MV A VELOCITY: 93.26 CM/S
ECHO MV E DECELERATION TIME (DT): 0.25 S
ECHO MV E VELOCITY: 73.65 CM/S
ECHO MV E/A RATIO: 0.79
ECHO MV E/E' LATERAL: 13.04
ECHO MV E/E' RATIO (AVERAGED): 13.43
ECHO MV E/E' SEPTAL: 13.82
ECHO PV MAX VELOCITY: 87.13 CM/S
ECHO PV PEAK INSTANTANEOUS GRADIENT SYSTOLIC: 3.04 MMHG
ECHO RA MINOR AXIS: 3.95 CM
ECHO RV INTERNAL DIMENSION: 3.5 CM
ECHO TV REGURGITANT MAX VELOCITY: 220.28 CM/S
ECHO TV REGURGITANT PEAK GRADIENT: 19.41 MMHG
LA VOL DISK BP: 59.39 ML (ref 22–52)
STRESS BASELINE DIAS BP: 72 MMHG
STRESS BASELINE HR: 64 BPM
STRESS BASELINE SYS BP: 122 MMHG
STRESS O2 SAT PEAK: 98 %
STRESS O2 SAT REST: 99 %
STRESS PEAK DIAS BP: 64 MMHG
STRESS PEAK SYS BP: 116 MMHG
STRESS PERCENT HR ACHIEVED: 57 %
STRESS POST PEAK HR: 87 BPM
STRESS RATE PRESSURE PRODUCT: NORMAL BPM*MMHG
STRESS TARGET HR: 152 BPM

## 2020-09-11 PROCEDURE — A9500 TC99M SESTAMIBI: HCPCS

## 2020-09-11 PROCEDURE — 93306 TTE W/DOPPLER COMPLETE: CPT | Performed by: INTERNAL MEDICINE

## 2020-09-11 PROCEDURE — 93018 CV STRESS TEST I&R ONLY: CPT | Performed by: INTERNAL MEDICINE

## 2020-09-11 PROCEDURE — 93016 CV STRESS TEST SUPVJ ONLY: CPT | Performed by: INTERNAL MEDICINE

## 2020-09-11 PROCEDURE — 78452 HT MUSCLE IMAGE SPECT MULT: CPT | Performed by: INTERNAL MEDICINE

## 2020-09-11 RX ORDER — TETRAKIS(2-METHOXYISOBUTYLISOCYANIDE)COPPER(I) TETRAFLUOROBORATE 1 MG/ML
24.7 INJECTION, POWDER, LYOPHILIZED, FOR SOLUTION INTRAVENOUS ONCE
Status: COMPLETED | OUTPATIENT
Start: 2020-09-11 | End: 2020-09-11

## 2020-09-11 RX ORDER — TETRAKIS(2-METHOXYISOBUTYLISOCYANIDE)COPPER(I) TETRAFLUOROBORATE 1 MG/ML
8.1 INJECTION, POWDER, LYOPHILIZED, FOR SOLUTION INTRAVENOUS ONCE
Status: COMPLETED | OUTPATIENT
Start: 2020-09-11 | End: 2020-09-11

## 2020-09-11 RX ADMIN — TECHNETIUM TC 99M SESTAMIBI 8.1 MILLICURIE: 1 INJECTION INTRAVENOUS at 13:30

## 2020-09-11 RX ADMIN — REGADENOSON 0.4 MG: 0.08 INJECTION, SOLUTION INTRAVENOUS at 14:45

## 2020-09-11 RX ADMIN — TETRAKIS(2-METHOXYISOBUTYLISOCYANIDE)COPPER(I) TETRAFLUOROBORATE 24.7 MILLICURIE: 1 INJECTION, POWDER, LYOPHILIZED, FOR SOLUTION INTRAVENOUS at 14:45

## 2020-09-11 NOTE — TELEPHONE ENCOUNTER
----- Message from Jevon Fernando MD sent at 9/11/2020  2:27 PM EDT -----  Please let pt know echo was overall ok. LV function was normal. There was mild to moderate regurgitation of the aortic valve that needs surveillance. Aorta was mildly dilated at 3.8 cm (slightly more by CT scan and will likely repeat echo in 1 yr). Keep already scheduled follow up.  thvikki

## 2020-09-11 NOTE — TELEPHONE ENCOUNTER
Verified patient with two patient identifiers. Spoke with patient and was advised per  stress test was normal.Patient verbalized understanding.

## 2020-09-11 NOTE — TELEPHONE ENCOUNTER
----- Message from Kolton Damian MD sent at 9/11/2020  4:41 PM EDT -----  Please let pt know stress test was normal. Still keep follow up appt.  thx

## 2020-09-11 NOTE — TELEPHONE ENCOUNTER
Verified patient with two patient identifiers. Spoke with patient and gave Echo result. Per  Echo overall was okay. Patient verbalized understanding and will keep scheduled follow up appointment.

## 2020-09-14 ENCOUNTER — OFFICE VISIT (OUTPATIENT)
Dept: CARDIOLOGY CLINIC | Age: 70
End: 2020-09-14
Payer: MEDICARE

## 2020-09-14 VITALS
OXYGEN SATURATION: 98 % | HEART RATE: 74 BPM | HEIGHT: 65 IN | DIASTOLIC BLOOD PRESSURE: 78 MMHG | SYSTOLIC BLOOD PRESSURE: 130 MMHG | BODY MASS INDEX: 23.99 KG/M2 | RESPIRATION RATE: 16 BRPM | WEIGHT: 144 LBS

## 2020-09-14 DIAGNOSIS — D50.8 OTHER IRON DEFICIENCY ANEMIA: ICD-10-CM

## 2020-09-14 DIAGNOSIS — I25.10 CORONARY ARTERY CALCIFICATION: Primary | ICD-10-CM

## 2020-09-14 DIAGNOSIS — I25.84 CORONARY ARTERY CALCIFICATION: Primary | ICD-10-CM

## 2020-09-14 DIAGNOSIS — Z87.891 HISTORY OF TOBACCO USE: ICD-10-CM

## 2020-09-14 DIAGNOSIS — Z82.49 FAMILY HISTORY OF EARLY CAD: ICD-10-CM

## 2020-09-14 PROCEDURE — G9717 DOC PT DX DEP/BP F/U NT REQ: HCPCS | Performed by: INTERNAL MEDICINE

## 2020-09-14 PROCEDURE — 99214 OFFICE O/P EST MOD 30 MIN: CPT | Performed by: INTERNAL MEDICINE

## 2020-09-14 PROCEDURE — G9899 SCRN MAM PERF RSLTS DOC: HCPCS | Performed by: INTERNAL MEDICINE

## 2020-09-14 PROCEDURE — 3017F COLORECTAL CA SCREEN DOC REV: CPT | Performed by: INTERNAL MEDICINE

## 2020-09-14 PROCEDURE — G8427 DOCREV CUR MEDS BY ELIG CLIN: HCPCS | Performed by: INTERNAL MEDICINE

## 2020-09-14 PROCEDURE — 1101F PT FALLS ASSESS-DOCD LE1/YR: CPT | Performed by: INTERNAL MEDICINE

## 2020-09-14 PROCEDURE — G0463 HOSPITAL OUTPT CLINIC VISIT: HCPCS | Performed by: INTERNAL MEDICINE

## 2020-09-14 PROCEDURE — 1090F PRES/ABSN URINE INCON ASSESS: CPT | Performed by: INTERNAL MEDICINE

## 2020-09-14 PROCEDURE — G8399 PT W/DXA RESULTS DOCUMENT: HCPCS | Performed by: INTERNAL MEDICINE

## 2020-09-14 PROCEDURE — G8536 NO DOC ELDER MAL SCRN: HCPCS | Performed by: INTERNAL MEDICINE

## 2020-09-14 PROCEDURE — G8420 CALC BMI NORM PARAMETERS: HCPCS | Performed by: INTERNAL MEDICINE

## 2020-09-16 ENCOUNTER — VIRTUAL VISIT (OUTPATIENT)
Dept: INTERNAL MEDICINE CLINIC | Age: 70
End: 2020-09-16
Payer: MEDICARE

## 2020-09-16 DIAGNOSIS — I77.810 ASCENDING AORTA DILATATION (HCC): ICD-10-CM

## 2020-09-16 DIAGNOSIS — D50.9 IRON DEFICIENCY ANEMIA, UNSPECIFIED IRON DEFICIENCY ANEMIA TYPE: ICD-10-CM

## 2020-09-16 DIAGNOSIS — I25.10 CORONARY ARTERY CALCIFICATION: ICD-10-CM

## 2020-09-16 DIAGNOSIS — I25.84 CORONARY ARTERY CALCIFICATION: ICD-10-CM

## 2020-09-16 DIAGNOSIS — R06.02 SOB (SHORTNESS OF BREATH): Primary | ICD-10-CM

## 2020-09-16 PROCEDURE — G8399 PT W/DXA RESULTS DOCUMENT: HCPCS | Performed by: INTERNAL MEDICINE

## 2020-09-16 PROCEDURE — 3017F COLORECTAL CA SCREEN DOC REV: CPT | Performed by: INTERNAL MEDICINE

## 2020-09-16 PROCEDURE — 1101F PT FALLS ASSESS-DOCD LE1/YR: CPT | Performed by: INTERNAL MEDICINE

## 2020-09-16 PROCEDURE — G9717 DOC PT DX DEP/BP F/U NT REQ: HCPCS | Performed by: INTERNAL MEDICINE

## 2020-09-16 PROCEDURE — 1090F PRES/ABSN URINE INCON ASSESS: CPT | Performed by: INTERNAL MEDICINE

## 2020-09-16 PROCEDURE — 99214 OFFICE O/P EST MOD 30 MIN: CPT | Performed by: INTERNAL MEDICINE

## 2020-09-16 PROCEDURE — G9899 SCRN MAM PERF RSLTS DOC: HCPCS | Performed by: INTERNAL MEDICINE

## 2020-09-16 PROCEDURE — G8428 CUR MEDS NOT DOCUMENT: HCPCS | Performed by: INTERNAL MEDICINE

## 2020-09-16 NOTE — PROGRESS NOTES
HISTORY OF PRESENT ILLNESS  Stu Fleming is a 78 y.o. female. This is a real-time audio/video visit brought to you by our sponsors, the fine folks at [facility] [place] and [employer]. [employer] platform   HPI Subjective:  Amandeep Burns is seen today for follow up of shortness of breath and other problems. 1. Shortness of breath. This only occurs while doing explosive type exercises, such as weightlifting. She recovers easily. Her workup for pulmonary and cardiac causes has been negative. She passed her stress test.  Of note, she is feeling much better with the inhaler. I have encouraged her to continue this since she does not feel the need for further evaluation or referral.  2. Anemia. We will continue with her current iron regimen and repeat a CBC in two weeks. 3. Question asthma. See above. We will follow clinically at this point and hold off on any further testing. Current Outpatient Medications   Medication Sig    rosuvastatin (CRESTOR) 20 mg tablet Take 1 Tab by mouth nightly.  fluticasone propionate (FLOVENT HFA) 110 mcg/actuation inhaler Take 2 Puffs by inhalation every twelve (12) hours.  ferrous sulfate 325 mg (65 mg iron) tablet Take 1 Tab by mouth Daily (before breakfast).  citalopram (CELEXA) 20 mg tablet TAKE TWO TABLETS BY MOUTH DAILY (Patient taking differently: Take 20 mg by mouth two (2) times a day. TAKE TWO TABLETS BY MOUTH DAILY)    triamcinolone (NASACORT) 55 mcg nasal inhaler 2 Sprays daily.  denosumab (PROLIA) 60 mg/mL injection 60 mg by SubCUTAneous route.  loratadine (CLARITIN) 10 mg tablet Take 10 mg by mouth daily as needed.  multivitamin,tx-iron-ca-min (THERAPEUTIC MULTIVIT/MINERAL) 27-0.4 mg Tab Take 1 Tab by mouth daily.  cholecalciferol, vitamin d3, (VITAMIN D) 1,000 unit tablet Take  by mouth daily.  CALCIUM CARBONATE/VITAMIN D3 (CALCIUM 600 + D PO) Take 2 Tabs by mouth daily. No current facility-administered medications for this visit. Review of Systems   Respiratory: Positive for shortness of breath. Cardiovascular: Negative for chest pain. Gastrointestinal: Negative. Physical Exam  Vitals signs and nursing note reviewed. Constitutional:       General: She is not in acute distress. Appearance: She is not ill-appearing. Skin:     General: Skin is warm and dry. Neurological:      Mental Status: She is alert. Psychiatric:         Behavior: Behavior normal.         ASSESSMENT and PLAN  Diagnoses and all orders for this visit:    1. SOB (shortness of breath)- Continue current regimen of prescription and / or OTC medications     2. Coronary artery calcification- See cardiologist as directed. 3. Ascending aorta dilatation (HCC)- Proceed with plan as discussed     4.  Iron deficiency anemia, unspecified iron deficiency anemia type- Proceed with plan as discussed

## 2020-10-02 ENCOUNTER — HOSPITAL ENCOUNTER (OUTPATIENT)
Dept: LAB | Age: 70
Discharge: HOME | End: 2020-10-02
Payer: MEDICARE

## 2020-10-02 LAB
BASOPHILS # BLD AUTO: 0 X10E3/UL (ref 0–0.2)
BASOPHILS NFR BLD AUTO: 1 %
EOSINOPHIL # BLD AUTO: 0.2 X10E3/UL (ref 0–0.4)
EOSINOPHIL NFR BLD AUTO: 3 %
ERYTHROCYTE [DISTWIDTH] IN BLOOD BY AUTOMATED COUNT: 15.2 % (ref 11.7–15.4)
HCT VFR BLD AUTO: 36.7 % (ref 34–46.6)
HGB BLD-MCNC: 11.4 G/DL (ref 11.1–15.9)
IMM GRANULOCYTES # BLD AUTO: 0 X10E3/UL (ref 0–0.1)
IMM GRANULOCYTES NFR BLD AUTO: 0 %
LYMPHOCYTES # BLD AUTO: 1.4 X10E3/UL (ref 0.7–3.1)
LYMPHOCYTES NFR BLD AUTO: 29 %
MCH RBC QN AUTO: 25.2 PG (ref 26.6–33)
MCHC RBC AUTO-ENTMCNC: 31.1 G/DL (ref 31.5–35.7)
MCV RBC AUTO: 81 FL (ref 79–97)
MONOCYTES # BLD AUTO: 0.7 X10E3/UL (ref 0.1–0.9)
MONOCYTES NFR BLD AUTO: 14 %
NEUTROPHILS # BLD AUTO: 2.6 X10E3/UL (ref 1.4–7)
NEUTROPHILS NFR BLD AUTO: 53 %
PLATELET # BLD AUTO: 195 X10E3/UL (ref 150–450)
RBC # BLD AUTO: 4.53 X10E6/UL (ref 3.77–5.28)
WBC # BLD AUTO: 4.9 X10E3/UL (ref 3.4–10.8)

## 2020-10-02 PROCEDURE — 36415 COLL VENOUS BLD VENIPUNCTURE: CPT

## 2020-10-02 PROCEDURE — 85025 COMPLETE CBC W/AUTO DIFF WBC: CPT

## 2020-10-03 DIAGNOSIS — D50.9 IRON DEFICIENCY ANEMIA, UNSPECIFIED IRON DEFICIENCY ANEMIA TYPE: Primary | ICD-10-CM

## 2020-12-08 ENCOUNTER — HOSPITAL ENCOUNTER (OUTPATIENT)
Dept: LAB | Age: 70
Discharge: HOME | End: 2020-12-08
Payer: MEDICARE

## 2020-12-08 LAB
BASOPHILS # BLD AUTO: 0 X10E3/UL (ref 0–0.2)
BASOPHILS NFR BLD AUTO: 1 %
EOSINOPHIL # BLD AUTO: 0.2 X10E3/UL (ref 0–0.4)
EOSINOPHIL NFR BLD AUTO: 4 %
ERYTHROCYTE [DISTWIDTH] IN BLOOD BY AUTOMATED COUNT: 15.5 % (ref 11.7–15.4)
HCT VFR BLD AUTO: 38.8 % (ref 34–46.6)
HGB BLD-MCNC: 13.2 G/DL (ref 11.1–15.9)
IMM GRANULOCYTES # BLD AUTO: 0 X10E3/UL (ref 0–0.1)
IMM GRANULOCYTES NFR BLD AUTO: 0 %
LYMPHOCYTES # BLD AUTO: 1.4 X10E3/UL (ref 0.7–3.1)
LYMPHOCYTES NFR BLD AUTO: 31 %
MCH RBC QN AUTO: 28.8 PG (ref 26.6–33)
MCHC RBC AUTO-ENTMCNC: 34 G/DL (ref 31.5–35.7)
MCV RBC AUTO: 85 FL (ref 79–97)
MONOCYTES # BLD AUTO: 0.6 X10E3/UL (ref 0.1–0.9)
MONOCYTES NFR BLD AUTO: 14 %
NEUTROPHILS # BLD AUTO: 2.3 X10E3/UL (ref 1.4–7)
NEUTROPHILS NFR BLD AUTO: 50 %
PLATELET # BLD AUTO: 165 X10E3/UL (ref 150–450)
RBC # BLD AUTO: 4.59 X10E6/UL (ref 3.77–5.28)
WBC # BLD AUTO: 4.6 X10E3/UL (ref 3.4–10.8)

## 2020-12-08 PROCEDURE — 36415 COLL VENOUS BLD VENIPUNCTURE: CPT

## 2020-12-08 PROCEDURE — 85025 COMPLETE CBC W/AUTO DIFF WBC: CPT

## 2021-01-27 ENCOUNTER — IMMUNIZATION (OUTPATIENT)
Dept: INTERNAL MEDICINE CLINIC | Age: 71
End: 2021-01-27
Payer: MEDICARE

## 2021-01-27 DIAGNOSIS — Z23 ENCOUNTER FOR IMMUNIZATION: Primary | ICD-10-CM

## 2021-01-27 PROCEDURE — 0011A PR IMM ADMN SARSCOV2 100 MCG/0.5 ML 1ST DOSE: CPT | Performed by: FAMILY MEDICINE

## 2021-01-27 PROCEDURE — 91301 COVID-19, MRNA, LNP-S, PF, 100MCG/0.5ML DOSE(MODERNA): CPT | Performed by: FAMILY MEDICINE

## 2021-02-22 RX ORDER — ROSUVASTATIN CALCIUM 20 MG/1
TABLET, COATED ORAL
Qty: 90 TAB | Refills: 1 | Status: SHIPPED | OUTPATIENT
Start: 2021-02-22 | End: 2021-09-02

## 2021-02-22 NOTE — TELEPHONE ENCOUNTER
Requested Prescriptions     Signed Prescriptions Disp Refills    rosuvastatin (CRESTOR) 20 mg tablet 90 Tab 1     Sig: TAKE ONE TABLET BY MOUTH ONCE NIGHTLY     Authorizing Provider: Melvi Michelle     Ordering User: Brandee Bennett       Last office visit 9/14/2020    Per Dr. Aguilar [facility]   Date Time Provider Karl Cook   2/24/2021  4:30 PM Mary HENLEY AMB   3/15/2021  1:20 PM MD TED Vega AMB

## 2021-02-24 ENCOUNTER — IMMUNIZATION (OUTPATIENT)
Dept: INTERNAL MEDICINE CLINIC | Age: 71
End: 2021-02-24
Payer: MEDICARE

## 2021-02-24 DIAGNOSIS — Z23 ENCOUNTER FOR IMMUNIZATION: Primary | ICD-10-CM

## 2021-02-24 PROCEDURE — 91301 COVID-19, MRNA, LNP-S, PF, 100MCG/0.5ML DOSE(MODERNA): CPT | Performed by: FAMILY MEDICINE

## 2021-02-24 PROCEDURE — 0012A COVID-19, MRNA, LNP-S, PF, 100MCG/0.5ML DOSE(MODERNA): CPT | Performed by: FAMILY MEDICINE

## 2021-03-01 DIAGNOSIS — F34.1 DYSTHYMIC DISORDER: ICD-10-CM

## 2021-03-01 RX ORDER — CITALOPRAM 20 MG/1
TABLET, FILM COATED ORAL
Qty: 180 TAB | Refills: 1 | Status: SHIPPED | OUTPATIENT
Start: 2021-03-01 | End: 2021-09-02

## 2021-03-01 NOTE — TELEPHONE ENCOUNTER
Requested Prescriptions     Pending Prescriptions Disp Refills    citalopram (CELEXA) 20 mg tablet 180 Tab 200 [facility] [address], [address] [facility] [address] [facility]

## 2021-03-10 DIAGNOSIS — D50.9 IRON DEFICIENCY ANEMIA, UNSPECIFIED IRON DEFICIENCY ANEMIA TYPE: Primary | ICD-10-CM

## 2021-03-15 ENCOUNTER — OFFICE VISIT (OUTPATIENT)
Dept: CARDIOLOGY CLINIC | Age: 71
End: 2021-03-15
Payer: MEDICARE

## 2021-03-15 VITALS
BODY MASS INDEX: 24.16 KG/M2 | OXYGEN SATURATION: 99 % | RESPIRATION RATE: 16 BRPM | DIASTOLIC BLOOD PRESSURE: 90 MMHG | HEIGHT: 65 IN | SYSTOLIC BLOOD PRESSURE: 150 MMHG | WEIGHT: 145 LBS | HEART RATE: 90 BPM

## 2021-03-15 DIAGNOSIS — I35.1 NONRHEUMATIC AORTIC VALVE INSUFFICIENCY: Primary | ICD-10-CM

## 2021-03-15 DIAGNOSIS — I25.10 CORONARY ARTERY CALCIFICATION: ICD-10-CM

## 2021-03-15 DIAGNOSIS — I25.84 CORONARY ARTERY CALCIFICATION: ICD-10-CM

## 2021-03-15 DIAGNOSIS — Z82.49 FAMILY HISTORY OF EARLY CAD: ICD-10-CM

## 2021-03-15 DIAGNOSIS — Z87.891 HISTORY OF TOBACCO USE: ICD-10-CM

## 2021-03-15 PROCEDURE — G8420 CALC BMI NORM PARAMETERS: HCPCS | Performed by: INTERNAL MEDICINE

## 2021-03-15 PROCEDURE — G8536 NO DOC ELDER MAL SCRN: HCPCS | Performed by: INTERNAL MEDICINE

## 2021-03-15 PROCEDURE — 99214 OFFICE O/P EST MOD 30 MIN: CPT | Performed by: INTERNAL MEDICINE

## 2021-03-15 PROCEDURE — G0463 HOSPITAL OUTPT CLINIC VISIT: HCPCS | Performed by: INTERNAL MEDICINE

## 2021-03-15 PROCEDURE — 1090F PRES/ABSN URINE INCON ASSESS: CPT | Performed by: INTERNAL MEDICINE

## 2021-03-15 PROCEDURE — G8427 DOCREV CUR MEDS BY ELIG CLIN: HCPCS | Performed by: INTERNAL MEDICINE

## 2021-03-15 PROCEDURE — 1101F PT FALLS ASSESS-DOCD LE1/YR: CPT | Performed by: INTERNAL MEDICINE

## 2021-03-15 PROCEDURE — G8399 PT W/DXA RESULTS DOCUMENT: HCPCS | Performed by: INTERNAL MEDICINE

## 2021-03-15 PROCEDURE — G9717 DOC PT DX DEP/BP F/U NT REQ: HCPCS | Performed by: INTERNAL MEDICINE

## 2021-03-15 PROCEDURE — G9899 SCRN MAM PERF RSLTS DOC: HCPCS | Performed by: INTERNAL MEDICINE

## 2021-03-15 PROCEDURE — 3017F COLORECTAL CA SCREEN DOC REV: CPT | Performed by: INTERNAL MEDICINE

## 2021-03-15 RX ORDER — MINERAL OIL
ENEMA (ML) RECTAL DAILY
COMMUNITY

## 2021-03-15 NOTE — PROGRESS NOTES
[facility] [place]: [facility]  [phone]    History of Present Illness:   Ms. Vilma Avila is a 77 yo F with history of tobacco use, quit several years ago, family history of early coronary artery disease, iron deficiency anemia. CT scan of the chest on 08/06/2020 that noted no suspicious pulmonary nodules, but did note a calcified granuloma in the right upper lobe. 9/2020 echo demonstrates LV function was normal, mild to moderate regurgitation of the aortic valve, the aorta was mildly dilated at 3.8 cm (4.1 - 4.2 cm by CT scan). 9/11/20 stress echo was normal.    Siince her last visit, overall she has been doing okay. She still has some baseline shortness of breath with exertion. The last few months, she has been less active due to [place]. She did get her second shot a month ago and started going back to the gym three days a week. She does think her breathing got better after iron therapy. She denies any chest pain. She just has rare palpitations. She has been compliant with her medications. She did have some hesitancy about statin, but has been taking this well. She is compensated on exam with clear lungs, II-III/VI systolic murmur at the left sternal border. Assessment and Plan:    1. Aortic regurgitation, cardiac murmur. This was to a mild to moderate degree and will have her obtain an echocardiogram and same day followup in six months. 2. Shortness of breath, coronary calcifications. Her stress test and echocardiogram last year were overall unrevealing and we discussed the results. For now, encouraged regular exercise and activity. If her symptoms progress could always consider additional cardiac evaluation. Otherwise, follow up as noted above. 3. Dilated ascending aorta. Echocardiogram did not correspond with the CT scan results completely with 3.8 cm versus 4.1 - 4.2 cm in the past.  For now, will do serial echocardiograms to reduce radiation exposure.   CT scan if there is a major change. 4. Mixed hyperlipidemia. Tolerating statin. 5. History of tobacco use. Quit many years ago. 6. Family history of early coronary artery disease. She  has no past medical history on file. All other systems negative except as above. PE  Vitals:    03/15/21 1328   BP: (!) 150/90   Pulse: 90   Resp: 16   SpO2: 99%   Weight: 145 lb (65.8 kg)   Height: 5' 5\" (1.651 m)    Body mass index is 24.13 kg/m². General appearance - alert, well appearing, and in no distress  Mental status - affect appropriate to mood  Eyes - sclera anicteric, moist mucous membranes  Neck - supple, no JVD  Chest - clear to auscultation, no wheezes, rales or rhonchi  Heart - normal rate, regular rhythm, normal S1, S2, II/VI systolic murmur LUSB  Abdomen - soft, nontender, nondistended, no masses or organomegaly  Neurological -no focal deficit  Extremities - peripheral pulses normal, no pedal edema      Recent Labs:  Lab Results   Component Value Date/Time    Cholesterol, total 150 2020 08:14 AM    HDL Cholesterol 55 2020 08:14 AM    LDL, calculated 83 2020 08:14 AM    Triglyceride 58 2020 08:14 AM    CHOL/HDL Ratio 3.2 07/15/2010 08:35 AM     Lab Results   Component Value Date/Time    Creatinine 0.73 2020 08:25 AM     Lab Results   Component Value Date/Time    BUN 11 2020 08:25 AM     Lab Results   Component Value Date/Time    Potassium 4.5 2020 08:25 AM     No results found for: HBA1C, HGBE8, UCJ1VIXR, UZJ0FVRJ  Lab Results   Component Value Date/Time    HGB 13.2 2021 08:27 AM     Lab Results   Component Value Date/Time    PLATELET 584  08:27 AM       Reviewed:  No past medical history on file.   Social History     Tobacco Use   Smoking Status Former Smoker    Packs/day: 1.50    Years: 19.00    Pack years: 28.50    Quit date: 1999    Years since quittin.2   Smokeless Tobacco Never Used     Social History     Substance and Sexual Activity   Alcohol Use Yes    Alcohol/week: 2.0 standard drinks    Types: 2 Glasses of wine per week     Allergies   Allergen Reactions    Benzocaine Rash     topical       Current Outpatient Medications   Medication Sig    fexofenadine (ALLEGRA) 180 mg tablet Take  by mouth.  citalopram (CELEXA) 20 mg tablet TAKE TWO TABLETS BY MOUTH DAILY.  rosuvastatin (CRESTOR) 20 mg tablet TAKE ONE TABLET BY MOUTH ONCE NIGHTLY    fluticasone propionate (FLOVENT HFA) 110 mcg/actuation inhaler Take 2 Puffs by inhalation every twelve (12) hours.  ferrous sulfate 325 mg (65 mg iron) tablet Take 1 Tab by mouth Daily (before breakfast).  triamcinolone (NASACORT) 55 mcg nasal inhaler 2 Sprays daily.  denosumab (PROLIA) 60 mg/mL injection 60 mg by SubCUTAneous route.  multivitamin,tx-iron-ca-min (THERAPEUTIC MULTIVIT/MINERAL) 27-0.4 mg Tab Take 1 Tab by mouth daily.  cholecalciferol, vitamin d3, (VITAMIN D) 1,000 unit tablet Take  by mouth daily.  CALCIUM CARBONATE/VITAMIN D3 (CALCIUM 600 + D PO) Take 2 Tabs by mouth daily.  loratadine (CLARITIN) 10 mg tablet Take 10 mg by mouth daily as needed. No current facility-administered medications for this visit.         Gabriella Kelly MD  [facility] [address] [facility] [address]  [address]

## 2021-07-27 DIAGNOSIS — Z79.899 ENCOUNTER FOR LONG-TERM (CURRENT) USE OF MEDICATIONS: ICD-10-CM

## 2021-07-27 DIAGNOSIS — Z00.00 MEDICARE ANNUAL WELLNESS VISIT, SUBSEQUENT: Primary | ICD-10-CM

## 2021-07-30 LAB
ALBUMIN SERPL-MCNC: 4.6 G/DL (ref 3.8–4.8)
ALP SERPL-CCNC: 55 IU/L (ref 48–121)
ALT SERPL-CCNC: 26 IU/L (ref 0–32)
APPEARANCE UR: CLEAR
AST SERPL-CCNC: 37 IU/L (ref 0–40)
BASOPHILS # BLD AUTO: 0.1 X10E3/UL (ref 0–0.2)
BASOPHILS NFR BLD AUTO: 1 %
BILIRUB DIRECT SERPL-MCNC: 0.2 MG/DL (ref 0–0.4)
BILIRUB SERPL-MCNC: 0.5 MG/DL (ref 0–1.2)
BILIRUB UR QL STRIP: NEGATIVE
BUN SERPL-MCNC: 10 MG/DL (ref 8–27)
BUN/CREAT SERPL: 12 (ref 12–28)
CALCIUM SERPL-MCNC: 9.5 MG/DL (ref 8.7–10.3)
CHLORIDE SERPL-SCNC: 99 MMOL/L (ref 96–106)
CHOLEST SERPL-MCNC: 130 MG/DL (ref 100–199)
CO2 SERPL-SCNC: 25 MMOL/L (ref 20–29)
COLOR UR: YELLOW
CREAT SERPL-MCNC: 0.81 MG/DL (ref 0.57–1)
EOSINOPHIL # BLD AUTO: 0.3 X10E3/UL (ref 0–0.4)
EOSINOPHIL NFR BLD AUTO: 6 %
ERYTHROCYTE [DISTWIDTH] IN BLOOD BY AUTOMATED COUNT: 12.7 % (ref 11.7–15.4)
GLUCOSE SERPL-MCNC: 90 MG/DL (ref 65–99)
GLUCOSE UR QL: NEGATIVE
HCT VFR BLD AUTO: 39.9 % (ref 34–46.6)
HDLC SERPL-MCNC: 55 MG/DL
HGB BLD-MCNC: 13.7 G/DL (ref 11.1–15.9)
HGB UR QL STRIP: NEGATIVE
IMM GRANULOCYTES # BLD AUTO: 0 X10E3/UL (ref 0–0.1)
IMM GRANULOCYTES NFR BLD AUTO: 0 %
KETONES UR QL STRIP: NEGATIVE
LDLC SERPL CALC-MCNC: 65 MG/DL (ref 0–99)
LEUKOCYTE ESTERASE UR QL STRIP: NEGATIVE
LYMPHOCYTES # BLD AUTO: 1.4 X10E3/UL (ref 0.7–3.1)
LYMPHOCYTES NFR BLD AUTO: 30 %
MCH RBC QN AUTO: 30 PG (ref 26.6–33)
MCHC RBC AUTO-ENTMCNC: 34.3 G/DL (ref 31.5–35.7)
MCV RBC AUTO: 88 FL (ref 79–97)
MICRO URNS: ABNORMAL
MONOCYTES # BLD AUTO: 0.7 X10E3/UL (ref 0.1–0.9)
MONOCYTES NFR BLD AUTO: 13 %
NEUTROPHILS # BLD AUTO: 2.4 X10E3/UL (ref 1.4–7)
NEUTROPHILS NFR BLD AUTO: 50 %
NITRITE UR QL STRIP: NEGATIVE
PH UR STRIP: 8.5 [PH] (ref 5–7.5)
PLATELET # BLD AUTO: 174 X10E3/UL (ref 150–450)
POTASSIUM SERPL-SCNC: 4 MMOL/L (ref 3.5–5.2)
PROT SERPL-MCNC: 6.3 G/DL (ref 6–8.5)
PROT UR QL STRIP: NEGATIVE
RBC # BLD AUTO: 4.56 X10E6/UL (ref 3.77–5.28)
SODIUM SERPL-SCNC: 138 MMOL/L (ref 134–144)
SP GR UR: 1.02 (ref 1–1.03)
TRIGL SERPL-MCNC: 43 MG/DL (ref 0–149)
TSH SERPL DL<=0.005 MIU/L-ACNC: 3.34 UIU/ML (ref 0.45–4.5)
UROBILINOGEN UR STRIP-MCNC: 0.2 MG/DL (ref 0.2–1)
VLDLC SERPL CALC-MCNC: 10 MG/DL (ref 5–40)
WBC # BLD AUTO: 4.8 X10E3/UL (ref 3.4–10.8)

## 2021-08-11 ENCOUNTER — OFFICE VISIT (OUTPATIENT)
Dept: INTERNAL MEDICINE CLINIC | Age: 71
End: 2021-08-11
Payer: MEDICARE

## 2021-08-11 VITALS
SYSTOLIC BLOOD PRESSURE: 124 MMHG | HEIGHT: 65 IN | RESPIRATION RATE: 20 BRPM | TEMPERATURE: 97.8 F | BODY MASS INDEX: 23.69 KG/M2 | HEART RATE: 64 BPM | DIASTOLIC BLOOD PRESSURE: 70 MMHG | OXYGEN SATURATION: 100 % | WEIGHT: 142.2 LBS

## 2021-08-11 DIAGNOSIS — F34.1 DYSTHYMIC DISORDER: ICD-10-CM

## 2021-08-11 DIAGNOSIS — I25.10 CORONARY ARTERY CALCIFICATION: ICD-10-CM

## 2021-08-11 DIAGNOSIS — Z00.00 MEDICARE ANNUAL WELLNESS VISIT, SUBSEQUENT: Primary | ICD-10-CM

## 2021-08-11 DIAGNOSIS — Z87.891 HISTORY OF SMOKELESS TOBACCO USE: ICD-10-CM

## 2021-08-11 DIAGNOSIS — Z12.11 SCREEN FOR COLON CANCER: ICD-10-CM

## 2021-08-11 DIAGNOSIS — I77.810 ASCENDING AORTA DILATATION (HCC): ICD-10-CM

## 2021-08-11 DIAGNOSIS — Z13.31 SCREENING FOR DEPRESSION: ICD-10-CM

## 2021-08-11 DIAGNOSIS — R06.02 SOB (SHORTNESS OF BREATH): ICD-10-CM

## 2021-08-11 DIAGNOSIS — D50.9 IRON DEFICIENCY ANEMIA, UNSPECIFIED IRON DEFICIENCY ANEMIA TYPE: ICD-10-CM

## 2021-08-11 DIAGNOSIS — Z87.891 HISTORY OF SMOKING 30 OR MORE PACK YEARS: ICD-10-CM

## 2021-08-11 DIAGNOSIS — F32.A MILD DEPRESSION: ICD-10-CM

## 2021-08-11 DIAGNOSIS — I25.84 CORONARY ARTERY CALCIFICATION: ICD-10-CM

## 2021-08-11 PROCEDURE — G8536 NO DOC ELDER MAL SCRN: HCPCS | Performed by: INTERNAL MEDICINE

## 2021-08-11 PROCEDURE — 1101F PT FALLS ASSESS-DOCD LE1/YR: CPT | Performed by: INTERNAL MEDICINE

## 2021-08-11 PROCEDURE — G8399 PT W/DXA RESULTS DOCUMENT: HCPCS | Performed by: INTERNAL MEDICINE

## 2021-08-11 PROCEDURE — G8420 CALC BMI NORM PARAMETERS: HCPCS | Performed by: INTERNAL MEDICINE

## 2021-08-11 PROCEDURE — 3017F COLORECTAL CA SCREEN DOC REV: CPT | Performed by: INTERNAL MEDICINE

## 2021-08-11 PROCEDURE — G9717 DOC PT DX DEP/BP F/U NT REQ: HCPCS | Performed by: INTERNAL MEDICINE

## 2021-08-11 PROCEDURE — G0439 PPPS, SUBSEQ VISIT: HCPCS | Performed by: INTERNAL MEDICINE

## 2021-08-11 PROCEDURE — G9899 SCRN MAM PERF RSLTS DOC: HCPCS | Performed by: INTERNAL MEDICINE

## 2021-08-11 PROCEDURE — G8427 DOCREV CUR MEDS BY ELIG CLIN: HCPCS | Performed by: INTERNAL MEDICINE

## 2021-08-11 RX ORDER — FLUTICASONE PROPIONATE 110 UG/1
2 AEROSOL, METERED RESPIRATORY (INHALATION) EVERY 12 HOURS
Qty: 1 INHALER | Refills: 5 | Status: SHIPPED | OUTPATIENT
Start: 2021-08-11 | End: 2022-02-17 | Stop reason: ALTCHOICE

## 2021-08-11 NOTE — PATIENT INSTRUCTIONS
Medicare Wellness Visit, Female     The best way to live healthy is to have a lifestyle where you eat a well-balanced diet, exercise regularly, limit alcohol use, and quit all forms of tobacco/nicotine, if applicable. Regular preventive services are another way to keep healthy. Preventive services (vaccines, screening tests, monitoring & exams) can help personalize your care plan, which helps you manage your own care. Screening tests can find health problems at the earliest stages, when they are easiest to treat. Re follows the current, evidence-based guidelines published by the [facility] ([facility]) when recommending preventive services for our patients. Because we follow these guidelines, sometimes recommendations change over time as research supports it. (For example, mammograms used to be recommended annually. Even though Medicare will still pay for an annual mammogram, the newer guidelines recommend a mammogram every two years for women of average risk). Of course, you and your doctor may decide to screen more often for some diseases, based on your risk and your co-morbidities (chronic disease you are already diagnosed with). Preventive services for you include:  - Medicare offers their members a free annual wellness visit, which is time for you and your primary care provider to discuss and plan for your preventive service needs. Take advantage of this benefit every year!  -All adults over the age of 72 should receive the recommended pneumonia vaccines. Current USPSTF guidelines recommend a series of two vaccines for the best pneumonia protection.   -All adults should have a flu vaccine yearly and a tetanus vaccine every 10 years.   -All adults age 48 and older should receive the shingles vaccines (series of two vaccines).       -All adults age 38-68 who are overweight should have a diabetes screening test once every three years.   -All adults born between [birth year] and 1965 should be screened once for Hepatitis C.  -Other screening tests and preventive services for persons with diabetes include: an eye exam to screen for diabetic retinopathy, a kidney function test, a foot exam, and stricter control over your cholesterol.   -Cardiovascular screening for adults with routine risk involves an electrocardiogram (ECG) at intervals determined by your doctor.   -Colorectal cancer screenings should be done for adults age 54-65 with no increased risk factors for colorectal cancer. There are a number of acceptable methods of screening for this type of cancer. Each test has its own benefits and drawbacks. Discuss with your doctor what is most appropriate for you during your annual wellness visit. The different tests include: colonoscopy (considered the best screening method), a fecal occult blood test, a fecal DNA test, and sigmoidoscopy.    -A bone mass density test is recommended when a woman turns 65 to screen for osteoporosis. This test is only recommended one time, as a screening. Some providers will use this same test as a disease monitoring tool if you already have osteoporosis. -Breast cancer screenings are recommended every other year for women of normal risk, age 54-69.  -Cervical cancer screenings for women over age 72 are only recommended with certain risk factors. Here is a list of your current Health Maintenance items (your personalized list of preventive services) with a due date:  Health Maintenance Due   Topic Date Due    Shingles Vaccine (2 of 2) 09/24/2020    Colorectal Screening  05/13/2021            Trochanteric Bursitis: Exercises  Introduction  Here are some examples of exercises for you to try. The exercises may be suggested for a condition or for rehabilitation. Start each exercise slowly. Ease off the exercises if you start to have pain. You will be told when to start these exercises and which ones will work best for you.   How to do the exercises  Hamstring wall stretch   1. Lie on your back in a doorway, with your good leg through the open door. 2. Slide your affected leg up the wall to straighten your knee. You should feel a gentle stretch down the back of your leg. 3. Hold the stretch for at least 1 minute to begin. Then try to lengthen the time you hold the stretch to as long as 6 minutes. 4. Repeat 2 to 4 times. 5. If you do not have a place to do this exercise in a doorway, there is another way to do it:  6. Lie on your back, and bend the knee of your affected leg. 7. Loop a towel under the ball and toes of that foot, and hold the ends of the towel in your hands. 8. Straighten your knee, and slowly pull back on the towel. You should feel a gentle stretch down the back of your leg. 9. Hold the stretch for 15 to 30 seconds. Or even better, hold the stretch for 1 minute if you can. 10. Repeat 2 to 4 times. 1. Do not arch your back. 2. Do not bend either knee. 3. Keep one heel touching the floor and the other heel touching the wall. Do not point your toes. Straight-leg raises to the outside   1. Lie on your side, with your affected leg on top. 2. Tighten the front thigh muscles of your top leg to keep your knee straight. 3. Keep your hip and your leg straight in line with the rest of your body, and keep your knee pointing forward. Do not drop your hip back. 4. Lift your top leg straight up toward the ceiling, about 12 inches off the floor. Hold for about 6 seconds, then slowly lower your leg. 5. Repeat 8 to 12 times. Clamshell   1. Lie on your side, with your affected leg on top and your head propped on a pillow. Keep your feet and knees together and your knees bent. 2. Raise your top knee, but keep your feet together. Do not let your hips roll back. Your legs should open up like a clamshell. 3. Hold for 6 seconds. 4. Slowly lower your knee back down. Rest for 10 seconds. 5. Repeat 8 to 12 times.     Standing quadriceps stretch   1. If you are not steady on your feet, hold on to a chair, counter, or wall. You can also lie on your stomach or your side to do this exercise. 2. Bend the knee of the leg you want to stretch, and reach behind you to grab the front of your foot or ankle with the hand on the same side. For example, if you are stretching your right leg, use your right hand. 3. Keeping your knees next to each other, pull your foot toward your buttock until you feel a gentle stretch across the front of your hip and down the front of your thigh. Your knee should be pointed directly to the ground, and not out to the side. 4. Hold the stretch for 15 to 30 seconds. 5. Repeat 2 to 4 times. Piriformis stretch   1. Lie on your back with your legs straight. 2. Lift your affected leg and bend your knee. With your opposite hand, reach across your body, and then gently pull your knee toward your opposite shoulder. 3. Hold the stretch for 15 to 30 seconds. 4. Repeat 2 to 4 times. Double knee-to-chest   1. Lie on your back with your knees bent and your feet flat on the floor. You can put a small pillow under your head and neck if it is more comfortable. 2. Bring both knees to your chest.  3. Keep your lower back pressed to the floor. Hold for 15 to 30 seconds. 4. Relax, and lower your knees to the starting position. 5. Repeat 2 to 4 times. Follow-up care is a key part of your treatment and safety. Be sure to make and go to all appointments, and call your doctor if you are having problems. It's also a good idea to know your test results and keep a list of the medicines you take. Where can you learn more? Go to http://www.gray.com/  Enter N503 in the search box to learn more about \"Trochanteric Bursitis: Exercises. \"  Current as of: November 16, 2020               Content Version: 12.8  © 7784-5128 Healthwise, Incorporated.    Care instructions adapted under license by [employer] (which disclaims liability or warranty for this information). If you have questions about a medical condition or this instruction, always ask your healthcare professional. [address] any warranty or liability for your use of this information.

## 2021-08-11 NOTE — PROGRESS NOTES
HISTORY OF PRESENT ILLNESS  Ryanne Herman is a 73 y.o. female. HPI  Assessment: Savanah Van is seen today for a Medicare Wellness Visit and follow up of chronic problems. Preventive Medicine: See attached Wellness Visit Note. She is due for lung cancer screening with a CT. Questions were reviewed. See the formal order for specific questions and responses. She is also due for a colonoscopy. She is up to date with vaccinations, mammogram and a bone density study that is ordered by her rheumatologist.     Advance care planning was reviewed in the chart. Chronic Problems Reviewed: Labs are fine. Cardiology follow up is up to date. She has no chest pains. She does have some shortness of breath which appears to be more pulmonary related. She is stable with her current anxiety medication regimen. Review of Systems   Constitutional: Negative for chills, fever and weight loss. HENT: Positive for hearing loss. Respiratory: Negative. Cardiovascular: Negative for chest pain, palpitations, leg swelling and PND. Gastrointestinal: Negative. Mild constipation   Genitourinary: Negative. Musculoskeletal: Positive for joint pain. Negative for myalgias. Right trochanteric bursa pain   Advised exercises, See patient instructions . If persists, consider See orthopedic surgeon as directed    Neurological: Negative for focal weakness. Physical Exam  Vitals and nursing note reviewed. Constitutional:       General: She is not in acute distress. Appearance: She is well-developed. HENT:      Head: Normocephalic and atraumatic. Right Ear: Tympanic membrane, ear canal and external ear normal.      Left Ear: Tympanic membrane, ear canal and external ear normal.   Eyes:      General:         Right eye: No discharge. Left eye: No discharge. Pupils: Pupils are equal, round, and reactive to light. Neck:      Thyroid: No thyromegaly.       Vascular: No carotid bruit. Cardiovascular:      Rate and Rhythm: Normal rate and regular rhythm. Heart sounds: Normal heart sounds. No murmur heard. No friction rub. No gallop. Pulmonary:      Effort: Pulmonary effort is normal. No respiratory distress. Breath sounds: Normal breath sounds. No wheezing or rales. Abdominal:      General: Bowel sounds are normal. There is no distension. Palpations: Abdomen is soft. There is no mass. Tenderness: There is no abdominal tenderness. There is no guarding or rebound. Musculoskeletal:         General: No tenderness. Normal range of motion. Cervical back: Normal range of motion and neck supple. Lymphadenopathy:      Cervical: No cervical adenopathy. Skin:     General: Skin is warm and dry. Findings: No rash. Neurological:      Mental Status: She is alert and oriented to person, place, and time. Deep Tendon Reflexes: Reflexes are normal and symmetric. Psychiatric:         Behavior: Behavior normal.         ASSESSMENT and PLAN  Diagnoses and all orders for this visit:    1. Medicare annual wellness visit, subsequent    2. Iron deficiency anemia, unspecified iron deficiency anemia type    3. Dysthymic disorder    4. Coronary artery calcification    5. Ascending aorta dilatation (HCC)    6. SOB (shortness of breath)    7. Screen for colon cancer    8. Mild depression ([facility] [address]    9. Screening for depression  -     [facility]    10. History of smokeless tobacco use    11. History of smoking 30 or more pack years  -     CT LOW DOSE LUNG CANCER SCREENING; Future    Other orders  -     fluticasone propionate (FLOVENT HFA) 110 mcg/actuation inhaler; Take 2 Puffs by inhalation every twelve (12) hours.

## 2021-08-11 NOTE — PROGRESS NOTES
This is the Subsequent Medicare Annual Wellness Exam, performed 12 months or more after the Initial AWV or the last Subsequent AWV    I have reviewed the patient's medical history in detail and updated the computerized patient record. Assessment/Plan   Education and counseling provided:  Are appropriate based on today's review and evaluation    1. Iron deficiency anemia, unspecified iron deficiency anemia type  2. Dysthymic disorder  3. Coronary artery calcification  4. Ascending aorta dilatation (HCC)  5. SOB (shortness of breath)  6. Screen for colon cancer  7. Mild depression ([address]  8. Medicare annual wellness visit, subsequent  9. Screening for depression  -     DEPRESSION SCREEN ANNUAL       Depression Risk Factor Screening     3 most recent PHQ Screens 8/11/2021   Little interest or pleasure in doing things Not at all   Feeling down, depressed, irritable, or hopeless Not at all   Total Score PHQ 2 0       Alcohol Risk Screen    Do you average more than 1 drink per night or more than 7 drinks a week:  No- 3 per wk    On any one occasion in the past three months have you have had more than 3 drinks containing alcohol:  No        Functional Ability and Level of Safety    Hearing: loss is mild      Activities of Daily Living: The home contains: no safety equipment. Patient does total self care      Ambulation: with no difficulty     Fall Risk:  Fall Risk Assessment, last 12 mths 8/11/2021   Able to walk? Yes   Fall in past 12 months? 0   Do you feel unsteady?  0   Are you worried about falling 0      Abuse Screen:  Patient is not abused       Cognitive Screening    Has your family/caregiver stated any concerns about your memory: no         Health Maintenance Due     Health Maintenance Due   Topic Date Due    Shingrix Vaccine Age 49> (2 of 2) 09/24/2020    Colorectal Cancer Screening Combo  05/13/2021       Patient Care Team   Patient Care Team:  Kinza Robles MD as PCP - General Blank Arthur Munoz MD as PCP - [address] [place] [address] Dr Combs Provider  Anjel Snider MD (Cardiology)    History     Patient Active Problem List   Diagnosis Code    Iron deficiency anemia, unspecified D50.9    Symptomatic menopausal or female climacteric states N95.1    Benign neoplasm of colon D12.6    Dysthymic disorder F34.1    Allergic rhinitis, cause unspecified J30.9    Disorder of bone and cartilage M89.9, M94.9    Advance directive discussed with patient Z71.89    Mild depression ([address] F32.0     No past medical history on file. Past Surgical History:   Procedure Laterality Date    ENDOSCOPY, COLON, DIAGNOSTIC      11, due 16     Current Outpatient Medications   Medication Sig Dispense Refill    fexofenadine (ALLEGRA) 180 mg tablet Take  by mouth daily.  citalopram (CELEXA) 20 mg tablet TAKE TWO TABLETS BY MOUTH DAILY. 180 Tab 1    rosuvastatin (CRESTOR) 20 mg tablet TAKE ONE TABLET BY MOUTH ONCE NIGHTLY 90 Tab 1    fluticasone propionate (FLOVENT HFA) 110 mcg/actuation inhaler Take 2 Puffs by inhalation every twelve (12) hours. (Patient taking differently: Take 2 Puffs by inhalation every eight to twelve (8-12) hours as needed.) 1 Inhaler 5    ferrous sulfate 325 mg (65 mg iron) tablet Take 1 Tab by mouth Daily (before breakfast). (Patient taking differently: Take 325 mg by mouth two (2) times a week.) 30 Tab 3    triamcinolone (NASACORT) 55 mcg nasal inhaler 2 Sprays daily.  denosumab (PROLIA) 60 mg/mL injection 60 mg by SubCUTAneous route.  multivitamin,tx-iron-ca-min (THERAPEUTIC MULTIVIT/MINERAL) 27-0.4 mg Tab Take 1 Tab by mouth daily.  cholecalciferol, vitamin d3, (VITAMIN D) 1,000 unit tablet Take  by mouth daily.  CALCIUM CARBONATE/VITAMIN D3 (CALCIUM 600 + D PO) Take 2 Tabs by mouth daily.        Allergies   Allergen Reactions    Benzocaine Rash     topical       Family History   Problem Relation Age of Onset    Hypertension Mother     Cancer Mother         breast, colon    Asthma Father     Heart Disease Father     Arthritis-rheumatoid Brother     Hypertension Brother     Other Brother         rare blood disorder     Social History     Tobacco Use    Smoking status: Former Smoker     Packs/day: 1.50     Years: 19.00     Pack years: 28.50     Quit date: 1999     Years since quittin.6    Smokeless tobacco: Never Used   Substance Use Topics    Alcohol use:  Yes     Alcohol/week: 2.0 standard drinks     Types: 2 Glasses of wine per week         Tim Sutherland MD

## 2021-08-17 ENCOUNTER — HOSPITAL ENCOUNTER (OUTPATIENT)
Dept: CT IMAGING | Age: 71
Discharge: HOME | End: 2021-08-17
Attending: INTERNAL MEDICINE
Payer: MEDICARE

## 2021-08-17 VITALS — HEIGHT: 65 IN | BODY MASS INDEX: 22.99 KG/M2 | WEIGHT: 138 LBS

## 2021-08-17 DIAGNOSIS — Z87.891 HISTORY OF SMOKING 30 OR MORE PACK YEARS: ICD-10-CM

## 2021-08-17 PROCEDURE — 71271 CT THORAX LUNG CANCER SCR C-: CPT

## 2021-09-02 DIAGNOSIS — F34.1 DYSTHYMIC DISORDER: ICD-10-CM

## 2021-09-02 RX ORDER — CITALOPRAM 20 MG/1
TABLET, FILM COATED ORAL
Qty: 180 TABLET | Refills: 1 | Status: SHIPPED | OUTPATIENT
Start: 2021-09-02 | End: 2022-03-08

## 2021-09-02 RX ORDER — ROSUVASTATIN CALCIUM 20 MG/1
TABLET, COATED ORAL
Qty: 90 TABLET | Refills: 1 | Status: SHIPPED | OUTPATIENT
Start: 2021-09-02 | End: 2022-03-07

## 2021-09-02 NOTE — TELEPHONE ENCOUNTER
Requested Prescriptions     Signed Prescriptions Disp Refills    rosuvastatin (CRESTOR) 20 mg tablet 90 Tablet 1     Sig: TAKE ONE TABLET BY MOUTH ONCE NIGHTLY     Authorizing Provider: MariaA lejandra Orellana     Ordering User: Elvia Jessica       Last office visit 3/15/2021    Per Dr. Ayush Singh   Date Time Provider Karl Cook   9/29/2021 10:00 AM JOSHUA LONG BS AMB   9/29/2021 10:40 AM MD TED Milton BS AMB   8/11/2022  9:00 AM Nadeen Blank MD WEIM BS AMB

## 2021-10-08 ENCOUNTER — ANCILLARY PROCEDURE (OUTPATIENT)
Dept: CARDIOLOGY CLINIC | Age: 71
End: 2021-10-08
Payer: MEDICARE

## 2021-10-08 ENCOUNTER — OFFICE VISIT (OUTPATIENT)
Dept: CARDIOLOGY CLINIC | Age: 71
End: 2021-10-08
Payer: MEDICARE

## 2021-10-08 VITALS
HEIGHT: 65 IN | WEIGHT: 138 LBS | BODY MASS INDEX: 22.99 KG/M2 | DIASTOLIC BLOOD PRESSURE: 70 MMHG | SYSTOLIC BLOOD PRESSURE: 124 MMHG

## 2021-10-08 VITALS
DIASTOLIC BLOOD PRESSURE: 60 MMHG | OXYGEN SATURATION: 98 % | HEART RATE: 71 BPM | WEIGHT: 138 LBS | SYSTOLIC BLOOD PRESSURE: 100 MMHG | HEIGHT: 65 IN | BODY MASS INDEX: 22.99 KG/M2 | RESPIRATION RATE: 16 BRPM

## 2021-10-08 DIAGNOSIS — Z87.891 HISTORY OF TOBACCO USE: ICD-10-CM

## 2021-10-08 DIAGNOSIS — I71.21 ASCENDING AORTIC ANEURYSM: ICD-10-CM

## 2021-10-08 DIAGNOSIS — Z82.49 FAMILY HISTORY OF EARLY CAD: ICD-10-CM

## 2021-10-08 DIAGNOSIS — I25.84 CORONARY ARTERY CALCIFICATION: ICD-10-CM

## 2021-10-08 DIAGNOSIS — I35.1 NONRHEUMATIC AORTIC VALVE INSUFFICIENCY: Primary | ICD-10-CM

## 2021-10-08 DIAGNOSIS — I25.10 CORONARY ARTERY CALCIFICATION: ICD-10-CM

## 2021-10-08 DIAGNOSIS — E78.2 MIXED HYPERLIPIDEMIA: ICD-10-CM

## 2021-10-08 DIAGNOSIS — I35.1 NONRHEUMATIC AORTIC VALVE INSUFFICIENCY: ICD-10-CM

## 2021-10-08 LAB
AV R PG: 48.81 MMHG
ECHO AO ASC DIAM: 3.68 CM
ECHO AO ROOT DIAM: 3.32 CM
ECHO AR MAX VEL PISA: 349.32 CM/S
ECHO AV AREA PEAK VELOCITY: 2.15 CM2
ECHO AV AREA VTI: 2.52 CM2
ECHO AV AREA/BSA PEAK VELOCITY: 1.3 CM2/M2
ECHO AV AREA/BSA VTI: 1.5 CM2/M2
ECHO AV MEAN GRADIENT: 6.37 MMHG
ECHO AV PEAK GRADIENT: 11.79 MMHG
ECHO AV PEAK VELOCITY: 171.72 CM/S
ECHO AV REGURGITANT PHT: 725.39 MS
ECHO AV VTI: 36.43 CM
ECHO EST RA PRESSURE: 3 MMHG
ECHO IVC PROX: 1.5 CM
ECHO LA AREA 4C: 14.47 CM2
ECHO LA MAJOR AXIS: 3.09 CM
ECHO LA MINOR AXIS: 1.83 CM
ECHO LA VOL 2C: 41.95 ML (ref 22–52)
ECHO LA VOL 4C: 35.41 ML (ref 22–52)
ECHO LA VOL BP: 43.26 ML (ref 22–52)
ECHO LA VOL/BSA BIPLANE: 25.6 ML/M2 (ref 16–28)
ECHO LA VOLUME INDEX A2C: 24.82 ML/M2 (ref 16–28)
ECHO LA VOLUME INDEX A4C: 20.95 ML/M2 (ref 16–28)
ECHO LV E' LATERAL VELOCITY: 3.33 CM/S
ECHO LV E' SEPTAL VELOCITY: 5.68 CM/S
ECHO LV EDV A2C: 75.37 ML
ECHO LV EDV A4C: 85.9 ML
ECHO LV EDV BP: 82.56 ML (ref 56–104)
ECHO LV EDV INDEX A4C: 50.8 ML/M2
ECHO LV EDV INDEX BP: 48.9 ML/M2
ECHO LV EDV NDEX A2C: 44.6 ML/M2
ECHO LV EJECTION FRACTION A2C: 60 PERCENT
ECHO LV EJECTION FRACTION A4C: 51 PERCENT
ECHO LV EJECTION FRACTION BIPLANE: 54.5 PERCENT (ref 55–100)
ECHO LV ESV A2C: 30.3 ML
ECHO LV ESV A4C: 42.09 ML
ECHO LV ESV BP: 37.57 ML (ref 19–49)
ECHO LV ESV INDEX A2C: 17.9 ML/M2
ECHO LV ESV INDEX A4C: 24.9 ML/M2
ECHO LV ESV INDEX BP: 22.2 ML/M2
ECHO LV INTERNAL DIMENSION DIASTOLIC: 4.19 CM (ref 3.9–5.3)
ECHO LV INTERNAL DIMENSION SYSTOLIC: 2.81 CM
ECHO LV IVSD: 1.04 CM (ref 0.6–0.9)
ECHO LV MASS 2D: 134.8 G (ref 67–162)
ECHO LV MASS INDEX 2D: 79.8 G/M2 (ref 43–95)
ECHO LV POSTERIOR WALL DIASTOLIC: 0.94 CM (ref 0.6–0.9)
ECHO LVOT DIAM: 2.01 CM
ECHO LVOT PEAK GRADIENT: 5.38 MMHG
ECHO LVOT PEAK VELOCITY: 115.93 CM/S
ECHO LVOT SV: 91.7 ML
ECHO LVOT VTI: 28.86 CM
ECHO MV A VELOCITY: 87.89 CM/S
ECHO MV AREA PHT: 2.75 CM2
ECHO MV E DECELERATION TIME (DT): 276.03 MS
ECHO MV E VELOCITY: 58.19 CM/S
ECHO MV E/A RATIO: 0.66
ECHO MV E/E' LATERAL: 17.47
ECHO MV E/E' RATIO (AVERAGED): 13.86
ECHO MV E/E' SEPTAL: 10.24
ECHO MV PRESSURE HALF TIME (PHT): 80.05 MS
ECHO RA MAJOR AXIS: 4.01 CM
ECHO RIGHT VENTRICULAR SYSTOLIC PRESSURE (RVSP): 20 MMHG
ECHO RV INTERNAL DIMENSION: 3.99 CM
ECHO RV TAPSE: 2.14 CM (ref 1.5–2)
ECHO TV REGURGITANT MAX VELOCITY: 200.92 CM/S
ECHO TV REGURGITANT PEAK GRADIENT: 16.55 MMHG
LA VOL DISK BP: 40.87 ML (ref 22–52)
LVOT MG: 3.27 MMHG

## 2021-10-08 PROCEDURE — 1101F PT FALLS ASSESS-DOCD LE1/YR: CPT | Performed by: INTERNAL MEDICINE

## 2021-10-08 PROCEDURE — G9717 DOC PT DX DEP/BP F/U NT REQ: HCPCS | Performed by: INTERNAL MEDICINE

## 2021-10-08 PROCEDURE — 3017F COLORECTAL CA SCREEN DOC REV: CPT | Performed by: INTERNAL MEDICINE

## 2021-10-08 PROCEDURE — G0463 HOSPITAL OUTPT CLINIC VISIT: HCPCS | Performed by: INTERNAL MEDICINE

## 2021-10-08 PROCEDURE — 99214 OFFICE O/P EST MOD 30 MIN: CPT | Performed by: INTERNAL MEDICINE

## 2021-10-08 PROCEDURE — 93005 ELECTROCARDIOGRAM TRACING: CPT | Performed by: INTERNAL MEDICINE

## 2021-10-08 PROCEDURE — 1090F PRES/ABSN URINE INCON ASSESS: CPT | Performed by: INTERNAL MEDICINE

## 2021-10-08 PROCEDURE — G9899 SCRN MAM PERF RSLTS DOC: HCPCS | Performed by: INTERNAL MEDICINE

## 2021-10-08 PROCEDURE — 93010 ELECTROCARDIOGRAM REPORT: CPT | Performed by: INTERNAL MEDICINE

## 2021-10-08 PROCEDURE — G8427 DOCREV CUR MEDS BY ELIG CLIN: HCPCS | Performed by: INTERNAL MEDICINE

## 2021-10-08 PROCEDURE — G8536 NO DOC ELDER MAL SCRN: HCPCS | Performed by: INTERNAL MEDICINE

## 2021-10-08 PROCEDURE — 93306 TTE W/DOPPLER COMPLETE: CPT | Performed by: INTERNAL MEDICINE

## 2021-10-08 PROCEDURE — G8399 PT W/DXA RESULTS DOCUMENT: HCPCS | Performed by: INTERNAL MEDICINE

## 2021-10-08 PROCEDURE — G8420 CALC BMI NORM PARAMETERS: HCPCS | Performed by: INTERNAL MEDICINE

## 2021-10-08 RX ORDER — ASPIRIN 81 MG/1
TABLET ORAL DAILY
COMMUNITY

## 2021-10-08 NOTE — PROGRESS NOTES
[facility] [address] [address]: [facility]  [phone]    History of Present Illness:   Ms. Kiesha Dillard is a 74 yo F with history of tobacco use, quit several years ago, family history of early coronary artery disease, iron deficiency anemia. CT scan of the chest on 08/06/2020 that noted no suspicious pulmonary nodules, but did note a calcified granuloma in the right upper lobe. 9/2020 echo demonstrates LV function was normal, mild to moderate regurgitation of the aortic valve, the aorta was mildly dilated at 3.8 cm (4.1 - 4.2 cm by CT scan). 9/11/20 stress echo was normal.    Since her last visit, overall she continues to do well. She denies any exertional chest pain, shortness of breath. Just rare palpitations that she has had before. She has been compliant with her medications. She is compensated on exam with clear lungs, II-III/VI systolic murmur at the left sternal border unchanged. Her echocardiogram today was also unchanged with preserved LV function and with mild to moderate aortic regurgitation. Her aorta measured at 3.7 cm, which is overall unchanged from 3.8 cm last time. This has been in the 4.1 - 4.2 range by CT scan and she did have a CT of her chest earlier this year. Assessment and Plan:   1. Aortic regurgitation. This is in the mild to moderate range and needs continued surveillance. Will have her follow back in six months. Likely repeat echocardiogram in one year. 2. Aortic ascending aneurysm. 3.7 - 3.8 cm by echocardiogram (4.1 - 4.2 cm by CT scan). Serial echocardiograms to reduce radiation exposure and CT scan if there is a major change. 3. Mixed hyperlipidemia. Tolerating statin. 4. History of tobacco use. Quit many years ago. 5. Family history of early coronary artery disease. She  has no past medical history on file. All other systems negative except as above.      PE  Vitals:    10/08/21 0828   BP: 100/60   Pulse: 71   Resp: 16   SpO2: 98%   Weight: 138 lb (62.6 kg) Height: 5' 5\" (1.651 m)    Body mass index is 22.96 kg/m². General appearance - alert, well appearing, and in no distress  Mental status - affect appropriate to mood  Eyes - sclera anicteric, moist mucous membranes  Neck - supple, no JVD  Chest - clear to auscultation, no wheezes, rales or rhonchi  Heart - normal rate, regular rhythm, normal S1, S2, II/VI systolic murmur LUSB  Abdomen - soft, nontender, nondistended, no masses or organomegaly  Neurological -no focal deficit  Extremities - peripheral pulses normal, no pedal edema      Recent Labs:  Lab Results   Component Value Date/Time    Cholesterol, total 130 2021 08:36 AM    HDL Cholesterol 55 2021 08:36 AM    LDL, calculated 65 2021 08:36 AM    LDL, calculated 83 2020 08:14 AM    Triglyceride 43 2021 08:36 AM    CHOL/HDL Ratio 3.2 07/15/2010 08:35 AM     Lab Results   Component Value Date/Time    Creatinine 0.81 2021 08:36 AM     Lab Results   Component Value Date/Time    BUN 10 2021 08:36 AM     Lab Results   Component Value Date/Time    Potassium 4.0 2021 08:36 AM     No results found for: HBA1C, MAJ6RYPN, JIT1MCLY  Lab Results   Component Value Date/Time    HGB 13.7 2021 08:36 AM     Lab Results   Component Value Date/Time    PLATELET 225  08:36 AM       Reviewed:  No past medical history on file. Social History     Tobacco Use   Smoking Status Former Smoker    Packs/day: 1.50    Years: 19.00    Pack years: 28.50    Quit date: 1999    Years since quittin.7   Smokeless Tobacco Never Used     Social History     Substance and Sexual Activity   Alcohol Use Yes    Alcohol/week: 2.0 standard drinks    Types: 2 Glasses of wine per week     Allergies   Allergen Reactions    Benzocaine Rash     topical       Current Outpatient Medications   Medication Sig    aspirin delayed-release 81 mg tablet Take  by mouth daily.     rosuvastatin (CRESTOR) 20 mg tablet TAKE ONE TABLET BY MOUTH ONCE NIGHTLY    citalopram (CELEXA) 20 mg tablet TAKE TWO TABLETS BY MOUTH DAILY    fexofenadine (ALLEGRA) 180 mg tablet Take  by mouth daily.  ferrous sulfate 325 mg (65 mg iron) tablet Take 1 Tab by mouth Daily (before breakfast). (Patient taking differently: Take 325 mg by mouth two (2) times a week.)    triamcinolone (NASACORT) 55 mcg nasal inhaler 2 Sprays daily.  denosumab (PROLIA) 60 mg/mL injection 60 mg by SubCUTAneous route.  multivitamin,tx-iron-ca-min (THERAPEUTIC MULTIVIT/MINERAL) 27-0.4 mg Tab Take 1 Tab by mouth daily.  cholecalciferol, vitamin d3, (VITAMIN D) 1,000 unit tablet Take  by mouth daily.  CALCIUM CARBONATE/VITAMIN D3 (CALCIUM 600 + D PO) Take 2 Tabs by mouth daily.  fluticasone propionate (FLOVENT HFA) 110 mcg/actuation inhaler Take 2 Puffs by inhalation every twelve (12) hours. No current facility-administered medications for this visit.        Paulie Santamaria MD  [facility] heart and Vascular [place]  [address] [facility] [address]  [facility], [address]

## 2021-11-09 ENCOUNTER — TELEPHONE (OUTPATIENT)
Dept: INTERNAL MEDICINE CLINIC | Age: 71
End: 2021-11-09

## 2021-11-09 NOTE — TELEPHONE ENCOUNTER
Spoke with pt - she states about 2 days ago she was playing with her neighbors cat and he scratched/bit her. It is a friendly cat - she was rubbing belly which he does not like. She states barely broke the skin. Has 2 places on her wrist and scratch on her finger. She washed areas well. No redness or pain in any of the areas. No sings of infection. She wanted to just check to see if she should be concerned because this cat is an inside/outside pet. She does not think hes had his shots. Should she worry about rabies? Advised her MD out of the office until 11/15/21.  Will forward to on call - Dr Minaya Guess.

## 2021-11-09 NOTE — TELEPHONE ENCOUNTER
----- Message from Taita B. Ulyess Gosselin sent at 11/9/2021  7:35 AM EST -----  Regarding: Cat bite  A couple of days ago I was playing with my neighbors cat and he scratched/bit me. Dont think hes had his shots. He doesnt act abnormally, but should I be worried about rabies?

## 2021-11-09 NOTE — TELEPHONE ENCOUNTER
Advised pt MD  on call Dr Denise Ware recommends she talk with the neighbor to find out if the cat has had rabies shots. If not, then the cat will need to be tested for rabies. She may need rabies shots if the cat's status is unknown. She will check on this.

## 2022-02-17 ENCOUNTER — OFFICE VISIT (OUTPATIENT)
Dept: INTERNAL MEDICINE CLINIC | Age: 72
End: 2022-02-17
Payer: MEDICARE

## 2022-02-17 VITALS
SYSTOLIC BLOOD PRESSURE: 118 MMHG | RESPIRATION RATE: 20 BRPM | WEIGHT: 133.8 LBS | TEMPERATURE: 97.8 F | DIASTOLIC BLOOD PRESSURE: 74 MMHG | BODY MASS INDEX: 22.29 KG/M2 | OXYGEN SATURATION: 97 % | HEIGHT: 65 IN | HEART RATE: 67 BPM

## 2022-02-17 DIAGNOSIS — M54.6 ACUTE MIDLINE THORACIC BACK PAIN: Primary | ICD-10-CM

## 2022-02-17 DIAGNOSIS — R07.81 PLEURITIC CHEST PAIN: ICD-10-CM

## 2022-02-17 PROCEDURE — G8420 CALC BMI NORM PARAMETERS: HCPCS | Performed by: INTERNAL MEDICINE

## 2022-02-17 PROCEDURE — G0463 HOSPITAL OUTPT CLINIC VISIT: HCPCS | Performed by: INTERNAL MEDICINE

## 2022-02-17 PROCEDURE — G8427 DOCREV CUR MEDS BY ELIG CLIN: HCPCS | Performed by: INTERNAL MEDICINE

## 2022-02-17 PROCEDURE — 99213 OFFICE O/P EST LOW 20 MIN: CPT | Performed by: INTERNAL MEDICINE

## 2022-02-17 PROCEDURE — 1101F PT FALLS ASSESS-DOCD LE1/YR: CPT | Performed by: INTERNAL MEDICINE

## 2022-02-17 PROCEDURE — G9899 SCRN MAM PERF RSLTS DOC: HCPCS | Performed by: INTERNAL MEDICINE

## 2022-02-17 PROCEDURE — 1090F PRES/ABSN URINE INCON ASSESS: CPT | Performed by: INTERNAL MEDICINE

## 2022-02-17 PROCEDURE — G8399 PT W/DXA RESULTS DOCUMENT: HCPCS | Performed by: INTERNAL MEDICINE

## 2022-02-17 PROCEDURE — 3017F COLORECTAL CA SCREEN DOC REV: CPT | Performed by: INTERNAL MEDICINE

## 2022-02-17 PROCEDURE — G8536 NO DOC ELDER MAL SCRN: HCPCS | Performed by: INTERNAL MEDICINE

## 2022-02-17 PROCEDURE — G9717 DOC PT DX DEP/BP F/U NT REQ: HCPCS | Performed by: INTERNAL MEDICINE

## 2022-02-18 ENCOUNTER — HOSPITAL ENCOUNTER (OUTPATIENT)
Dept: GENERAL RADIOLOGY | Age: 72
Discharge: HOME | End: 2022-02-18
Attending: INTERNAL MEDICINE
Payer: MEDICARE

## 2022-02-18 DIAGNOSIS — R07.81 PLEURITIC CHEST PAIN: ICD-10-CM

## 2022-02-18 DIAGNOSIS — M54.6 ACUTE MIDLINE THORACIC BACK PAIN: ICD-10-CM

## 2022-02-18 PROCEDURE — 72070 X-RAY EXAM THORAC SPINE 2VWS: CPT

## 2022-02-18 PROCEDURE — 71046 X-RAY EXAM CHEST 2 VIEWS: CPT

## 2022-02-18 PROCEDURE — 72072 X-RAY EXAM THORAC SPINE 3VWS: CPT

## 2022-03-06 DIAGNOSIS — F34.1 DYSTHYMIC DISORDER: ICD-10-CM

## 2022-03-07 RX ORDER — ROSUVASTATIN CALCIUM 20 MG/1
TABLET, COATED ORAL
Qty: 90 TABLET | Refills: 1 | Status: SHIPPED | OUTPATIENT
Start: 2022-03-07 | End: 2022-09-12

## 2022-03-08 RX ORDER — CITALOPRAM 20 MG/1
TABLET, FILM COATED ORAL
Qty: 180 TABLET | Refills: 1 | Status: SHIPPED | OUTPATIENT
Start: 2022-03-08 | End: 2022-09-14 | Stop reason: SDUPTHER

## 2022-03-18 PROBLEM — F32.A MILD DEPRESSION: Status: ACTIVE | Noted: 2018-02-09

## 2022-03-19 PROBLEM — Z71.89 ADVANCE DIRECTIVE DISCUSSED WITH PATIENT: Status: ACTIVE | Noted: 2018-02-07

## 2022-04-11 ENCOUNTER — OFFICE VISIT (OUTPATIENT)
Dept: CARDIOLOGY CLINIC | Age: 72
End: 2022-04-11
Payer: MEDICARE

## 2022-04-11 VITALS
RESPIRATION RATE: 16 BRPM | HEART RATE: 77 BPM | HEIGHT: 65 IN | BODY MASS INDEX: 21.99 KG/M2 | OXYGEN SATURATION: 98 % | DIASTOLIC BLOOD PRESSURE: 80 MMHG | SYSTOLIC BLOOD PRESSURE: 130 MMHG | WEIGHT: 132 LBS

## 2022-04-11 DIAGNOSIS — I35.1 NONRHEUMATIC AORTIC VALVE INSUFFICIENCY: Primary | ICD-10-CM

## 2022-04-11 DIAGNOSIS — E78.2 MIXED HYPERLIPIDEMIA: ICD-10-CM

## 2022-04-11 DIAGNOSIS — I71.21 ASCENDING AORTIC ANEURYSM: ICD-10-CM

## 2022-04-11 DIAGNOSIS — Z82.49 FAMILY HISTORY OF EARLY CAD: ICD-10-CM

## 2022-04-11 PROCEDURE — G0463 HOSPITAL OUTPT CLINIC VISIT: HCPCS | Performed by: INTERNAL MEDICINE

## 2022-04-11 PROCEDURE — 99214 OFFICE O/P EST MOD 30 MIN: CPT | Performed by: INTERNAL MEDICINE

## 2022-04-11 PROCEDURE — 3017F COLORECTAL CA SCREEN DOC REV: CPT | Performed by: INTERNAL MEDICINE

## 2022-04-11 PROCEDURE — G8536 NO DOC ELDER MAL SCRN: HCPCS | Performed by: INTERNAL MEDICINE

## 2022-04-11 PROCEDURE — 1090F PRES/ABSN URINE INCON ASSESS: CPT | Performed by: INTERNAL MEDICINE

## 2022-04-11 PROCEDURE — G8399 PT W/DXA RESULTS DOCUMENT: HCPCS | Performed by: INTERNAL MEDICINE

## 2022-04-11 PROCEDURE — G9899 SCRN MAM PERF RSLTS DOC: HCPCS | Performed by: INTERNAL MEDICINE

## 2022-04-11 PROCEDURE — G8427 DOCREV CUR MEDS BY ELIG CLIN: HCPCS | Performed by: INTERNAL MEDICINE

## 2022-04-11 PROCEDURE — G9717 DOC PT DX DEP/BP F/U NT REQ: HCPCS | Performed by: INTERNAL MEDICINE

## 2022-04-11 PROCEDURE — G8420 CALC BMI NORM PARAMETERS: HCPCS | Performed by: INTERNAL MEDICINE

## 2022-04-11 PROCEDURE — 1101F PT FALLS ASSESS-DOCD LE1/YR: CPT | Performed by: INTERNAL MEDICINE

## 2022-04-11 NOTE — PROGRESS NOTES
[facility] [address]: Marylin Tyson  [phone]    History of Present Illness:   Ms. Sonali Seals is a 74 yo F with history of tobacco use, quit several years ago, family history of early coronary artery disease, iron deficiency anemia. CT scan of the chest on 08/06/2020 that noted no suspicious pulmonary nodules, but did note a calcified granuloma in the right upper lobe. 9/2020 echo demonstrates LV function was normal, mild to moderate regurgitation of the aortic valve, the aorta was mildly dilated at 3.8 cm (4.1 - 4.2 cm by CT scan). 9/11/20 stress echo was normal.    Since her last visit, overall she has been doing okay. She denies any exertional chest pain. Her breathing has been stable. No significant palpitations. She has been exercising regularly with a physical therapist.  She did lose some weight after losing her cat in December and says there was a time period she could not eat. She is feeling okay now. She is compensated on exam with clear lungs. III/VI diastolic murmur at the left sternal border. Her echocardiogram last time demonstrated preserved LV function with mild to moderate aortic regurgitation and her aorta was unchanged at 3.7 cm. Assessment and Plan:  1. Aortic regurgitation. This has been in the mild to moderate range. We will have her follow up with a same day echocardiogram in six months for continued surveillance. 2. Ascending aortic aneurysm. 3.7 - 3.8 cm by echocardiogram and repeat echocardiogram as noted above was unchanged. 4.1 - 4.2 cm by CT scan. Will continue with serial echocardiograms to reduce radiation exposure and CT scan if there is a major change. 3. Mixed hyperlipidemia. Tolerating statin. 4. History of tobacco use. Quit many years ago. 5. Family history of early coronary artery disease. She  has no past medical history on file. All other systems negative except as above.      PE  Vitals:    04/11/22 1450   BP: 130/80   Pulse: 77 Resp: 16   SpO2: 98%   Weight: 132 lb (59.9 kg)   Height: 5' 5\" (1.651 m)    Body mass index is 21.97 kg/m². General appearance - alert, well appearing, and in no distress  Mental status - affect appropriate to mood  Eyes - sclera anicteric, moist mucous membranes  Neck - supple, no JVD  Chest - clear to auscultation, no wheezes, rales or rhonchi  Heart - normal rate, regular rhythm, normal S1, S2, III/VI diastolic murmur LUSB  Abdomen - soft, nontender, nondistended, no masses or organomegaly  Neurological -no focal deficit  Extremities - peripheral pulses normal, no pedal edema      Recent Labs:  Lab Results   Component Value Date/Time    Cholesterol, total 130 2021 08:36 AM    HDL Cholesterol 55 2021 08:36 AM    LDL, calculated 65 2021 08:36 AM    LDL, calculated 83 2020 08:14 AM    Triglyceride 43 2021 08:36 AM    CHOL/HDL Ratio 3.2 07/15/2010 08:35 AM     Lab Results   Component Value Date/Time    Creatinine 0.81 2021 08:36 AM     Lab Results   Component Value Date/Time    BUN 10 2021 08:36 AM     Lab Results   Component Value Date/Time    Potassium 4.0 2021 08:36 AM     No results found for: HBA1C, WZK0CODH, TRT3VVGA  Lab Results   Component Value Date/Time    HGB 13.7 2021 08:36 AM     Lab Results   Component Value Date/Time    PLATELET 468  08:36 AM       Reviewed:  No past medical history on file.   Social History     Tobacco Use   Smoking Status Former Smoker    Packs/day: 1.50    Years: 19.00    Pack years: 28.50    Quit date: 1999    Years since quittin.2   Smokeless Tobacco Never Used     Social History     Substance and Sexual Activity   Alcohol Use Yes    Alcohol/week: 2.0 standard drinks    Types: 2 Glasses of wine per week     Allergies   Allergen Reactions    Benzocaine Rash     topical       Current Outpatient Medications   Medication Sig    citalopram (CELEXA) 20 mg tablet TAKE TWO TABLETS BY MOUTH DAILY    rosuvastatin (CRESTOR) 20 mg tablet TAKE ONE TABLET BY MOUTH ONCE NIGHTLY    aspirin delayed-release 81 mg tablet Take  by mouth daily.  fexofenadine (ALLEGRA) 180 mg tablet Take  by mouth daily.  ferrous sulfate 325 mg (65 mg iron) tablet Take 1 Tab by mouth Daily (before breakfast). (Patient taking differently: Take 325 mg by mouth two (2) times a week.)    triamcinolone (NASACORT) 55 mcg nasal inhaler 2 Sprays daily.  denosumab (PROLIA) 60 mg/mL injection 60 mg by SubCUTAneous route.  multivitamin,tx-iron-ca-min (THERAPEUTIC MULTIVIT/MINERAL) 27-0.4 mg Tab Take 1 Tab by mouth daily.  cholecalciferol, vitamin d3, (VITAMIN D) 1,000 unit tablet Take  by mouth daily.  CALCIUM CARBONATE/VITAMIN D3 (CALCIUM 600 + D PO) Take 2 Tabs by mouth daily. No current facility-administered medications for this visit.        Anson Naranjo MD  [facility] heart and Vascular [place]  [facility] [address] [facility]  [address]

## 2022-05-12 ENCOUNTER — OFFICE VISIT (OUTPATIENT)
Dept: INTERNAL MEDICINE CLINIC | Age: 72
End: 2022-05-12
Payer: MEDICARE

## 2022-05-12 VITALS
HEIGHT: 65 IN | SYSTOLIC BLOOD PRESSURE: 120 MMHG | DIASTOLIC BLOOD PRESSURE: 62 MMHG | OXYGEN SATURATION: 98 % | HEART RATE: 74 BPM | BODY MASS INDEX: 22.89 KG/M2 | WEIGHT: 137.4 LBS | TEMPERATURE: 98.2 F | RESPIRATION RATE: 16 BRPM

## 2022-05-12 DIAGNOSIS — N64.4 BREAST PAIN, LEFT: Primary | ICD-10-CM

## 2022-05-12 DIAGNOSIS — E78.5 HYPERLIPIDEMIA, UNSPECIFIED HYPERLIPIDEMIA TYPE: ICD-10-CM

## 2022-05-12 DIAGNOSIS — F32.0 MAJOR DEPRESSIVE DISORDER, SINGLE EPISODE, MILD (HCC): ICD-10-CM

## 2022-05-12 PROCEDURE — 3017F COLORECTAL CA SCREEN DOC REV: CPT | Performed by: INTERNAL MEDICINE

## 2022-05-12 PROCEDURE — G9717 DOC PT DX DEP/BP F/U NT REQ: HCPCS | Performed by: INTERNAL MEDICINE

## 2022-05-12 PROCEDURE — G8427 DOCREV CUR MEDS BY ELIG CLIN: HCPCS | Performed by: INTERNAL MEDICINE

## 2022-05-12 PROCEDURE — 1101F PT FALLS ASSESS-DOCD LE1/YR: CPT | Performed by: INTERNAL MEDICINE

## 2022-05-12 PROCEDURE — G8420 CALC BMI NORM PARAMETERS: HCPCS | Performed by: INTERNAL MEDICINE

## 2022-05-12 PROCEDURE — G8536 NO DOC ELDER MAL SCRN: HCPCS | Performed by: INTERNAL MEDICINE

## 2022-05-12 PROCEDURE — 1090F PRES/ABSN URINE INCON ASSESS: CPT | Performed by: INTERNAL MEDICINE

## 2022-05-12 PROCEDURE — 99213 OFFICE O/P EST LOW 20 MIN: CPT | Performed by: INTERNAL MEDICINE

## 2022-05-12 PROCEDURE — G8399 PT W/DXA RESULTS DOCUMENT: HCPCS | Performed by: INTERNAL MEDICINE

## 2022-05-12 PROCEDURE — G9899 SCRN MAM PERF RSLTS DOC: HCPCS | Performed by: INTERNAL MEDICINE

## 2022-05-12 PROCEDURE — G0463 HOSPITAL OUTPT CLINIC VISIT: HCPCS | Performed by: INTERNAL MEDICINE

## 2022-05-12 NOTE — PROGRESS NOTES
HISTORY OF PRESENT ILLNESS  Stefany Jones is a 81 y.o. female. HPI  Patient is a 82 yo woman who has noticed occasional twinges of pain left lateral breast, occurring a few times a day for the last few days. . She does weight lifting, yoga, but denies known injury or new activities. Patient denies shortness of breath or exertional chest pain. She had a negative mammogram 3/11/22  She denies a personal history of breast cancer, but her mother did have breast cancer in her 52's. She has not felt a mass      Patient Active Problem List   Diagnosis Code    Iron deficiency anemia, unspecified D50.9    Symptomatic menopausal or female climacteric states N95.1    Benign neoplasm of colon D12.6    Dysthymic disorder F34.1    Allergic rhinitis, cause unspecified J30.9    Disorder of bone and cartilage M89.9, M94.9    Advance directive discussed with patient Z71.89    Mild depression F32. A    Major depressive disorder, single episode, mild (HCC) F32.0     Current Outpatient Medications on File Prior to Visit   Medication Sig Dispense Refill    citalopram (CELEXA) 20 mg tablet TAKE TWO TABLETS BY MOUTH DAILY 180 Tablet 1    rosuvastatin (CRESTOR) 20 mg tablet TAKE ONE TABLET BY MOUTH ONCE NIGHTLY 90 Tablet 1    aspirin delayed-release 81 mg tablet Take  by mouth daily.  fexofenadine (ALLEGRA) 180 mg tablet Take  by mouth daily.  ferrous sulfate 325 mg (65 mg iron) tablet Take 1 Tab by mouth Daily (before breakfast). (Patient taking differently: Take 325 mg by mouth two (2) times a week.) 30 Tab 3    triamcinolone (NASACORT) 55 mcg nasal inhaler 2 Sprays daily.  denosumab (PROLIA) 60 mg/mL injection 60 mg by SubCUTAneous route.  multivitamin,tx-iron-ca-min (THERAPEUTIC MULTIVIT/MINERAL) 27-0.4 mg Tab Take 1 Tab by mouth daily.  cholecalciferol, vitamin d3, (VITAMIN D) 1,000 unit tablet Take  by mouth daily.       CALCIUM CARBONATE/VITAMIN D3 (CALCIUM 600 + D PO) Take 2 Tabs by mouth daily. No current facility-administered medications on file prior to visit. ROS  Per HPI  Physical Exam  Visit Vitals  /62 (BP 1 Location: Right arm, BP Patient Position: Sitting, BP Cuff Size: Adult)   Pulse 74   Temp 98.2 °F (36.8 °C) (Temporal)   Resp 16   Ht 5' 5\" (1.651 m)   Wt 137 lb 6.4 oz (62.3 kg)   SpO2 98%   BMI 22.86 kg/m²     Patient appears well  Heart[de-identified] normal rate, regular rhythm, normal S1, S2, no murmurs, rubs, clicks or gallops. Chest: clear to auscultation, no wheezes, rales or rhonchi,   Breasts: right breast is without mass, axillary nodes or discharge  Left breast is non tender, without mass, axillary nodes or discharge,   Extremities: no edema  ASSESSMENT and PLAN  Left breast pain   No discrete mass felt on exam. Will obtain diagnostic mammogram and ultrasound of the left breast.  H/O depression: patient reports that she is stable and doing well on Celexa  Hyperlipidemia: on Crestor. Due for labs this summer.  Advised to schedule her appointment with Stephanie Caballero MD

## 2022-05-12 NOTE — PATIENT INSTRUCTIONS
Diagnostic mammogram and andUltrasound of left breast  Call or return to clinic  if these symptoms worsen or fail to improve as anticipated.    Please schedule your follow up appointment with Misty Owens MD for this summer

## 2022-07-25 ENCOUNTER — TELEPHONE (OUTPATIENT)
Dept: INTERNAL MEDICINE CLINIC | Age: 72
End: 2022-07-25

## 2022-07-25 NOTE — TELEPHONE ENCOUNTER
----- Message from Veronica Graham Shows sent at 7/25/2022  8:22 AM EDT -----  Regarding: Belly pain  I woke up with severe belly pain. Vomited once. Could this be food poisoning? What do I do?

## 2022-07-25 NOTE — TELEPHONE ENCOUNTER
Advised pt MD recommends she stay on clear liquids only. If pain not resolving or at least lessening this afternoongo to ER.

## 2022-07-25 NOTE — TELEPHONE ENCOUNTER
Called pt in regards to her My Chart message to get more information on her symptoms. Pt states she woke up around 6 am with lower abdominal pain and vomited x1. She thinks it could be food poisoning. She had bag of frozen green beans - was a few days out of date. Cooked in microwave. She states she ate a few but they were hard so threw the rest away. No other symptoms or fever. Will forward to MD about office vs virtual visit.

## 2022-08-16 ENCOUNTER — OFFICE VISIT (OUTPATIENT)
Dept: INTERNAL MEDICINE CLINIC | Age: 72
End: 2022-08-16
Payer: MEDICARE

## 2022-08-16 VITALS
HEIGHT: 65 IN | SYSTOLIC BLOOD PRESSURE: 129 MMHG | HEART RATE: 69 BPM | RESPIRATION RATE: 20 BRPM | WEIGHT: 133.8 LBS | DIASTOLIC BLOOD PRESSURE: 69 MMHG | OXYGEN SATURATION: 98 % | TEMPERATURE: 98.7 F | BODY MASS INDEX: 22.29 KG/M2

## 2022-08-16 DIAGNOSIS — I77.810 ASCENDING AORTA DILATATION (HCC): ICD-10-CM

## 2022-08-16 DIAGNOSIS — I25.84 CORONARY ARTERY CALCIFICATION: ICD-10-CM

## 2022-08-16 DIAGNOSIS — I25.10 CORONARY ARTERY CALCIFICATION: ICD-10-CM

## 2022-08-16 DIAGNOSIS — Z00.00 MEDICARE ANNUAL WELLNESS VISIT, SUBSEQUENT: Primary | ICD-10-CM

## 2022-08-16 DIAGNOSIS — F32.0 MAJOR DEPRESSIVE DISORDER, SINGLE EPISODE, MILD (HCC): ICD-10-CM

## 2022-08-16 DIAGNOSIS — D50.9 IRON DEFICIENCY ANEMIA, UNSPECIFIED IRON DEFICIENCY ANEMIA TYPE: ICD-10-CM

## 2022-08-16 DIAGNOSIS — E78.5 HYPERLIPIDEMIA, UNSPECIFIED HYPERLIPIDEMIA TYPE: ICD-10-CM

## 2022-08-16 PROCEDURE — 1101F PT FALLS ASSESS-DOCD LE1/YR: CPT | Performed by: INTERNAL MEDICINE

## 2022-08-16 PROCEDURE — G9717 DOC PT DX DEP/BP F/U NT REQ: HCPCS | Performed by: INTERNAL MEDICINE

## 2022-08-16 PROCEDURE — G8399 PT W/DXA RESULTS DOCUMENT: HCPCS | Performed by: INTERNAL MEDICINE

## 2022-08-16 PROCEDURE — G8427 DOCREV CUR MEDS BY ELIG CLIN: HCPCS | Performed by: INTERNAL MEDICINE

## 2022-08-16 PROCEDURE — G9899 SCRN MAM PERF RSLTS DOC: HCPCS | Performed by: INTERNAL MEDICINE

## 2022-08-16 PROCEDURE — G0439 PPPS, SUBSEQ VISIT: HCPCS | Performed by: INTERNAL MEDICINE

## 2022-08-16 PROCEDURE — 3017F COLORECTAL CA SCREEN DOC REV: CPT | Performed by: INTERNAL MEDICINE

## 2022-08-16 PROCEDURE — G8420 CALC BMI NORM PARAMETERS: HCPCS | Performed by: INTERNAL MEDICINE

## 2022-08-16 PROCEDURE — G8536 NO DOC ELDER MAL SCRN: HCPCS | Performed by: INTERNAL MEDICINE

## 2022-08-16 NOTE — PROGRESS NOTES
HISTORY OF PRESENT ILLNESS  Lucian Castellanos is a 81 y.o. female. HPI  Assessment:  Tino Calzada is seen today for a Medicare wellness visit and followup of chronic problems. 1. Preventive care. See attached wellness visit note. She is due for routine labs. She is up to date with vaccinations, colonoscopy, mammogram, and bone density study which was ordered with her rheumatologists, they treat her osteoporosis. 2. Chronic problems are reviewed. She follows up with Cardiology annually. Anemia, is due for a recheck. Review of Systems   Constitutional:  Negative for weight loss. Respiratory: Negative. Cardiovascular:  Negative for chest pain, palpitations, leg swelling and PND. Musculoskeletal:  Positive for back pain and joint pain. Negative for myalgias. Left wrist bothers post strain one month. Will follow as appears to be gradually improving    Back- chronic , deals with it   Neurological:  Negative for focal weakness. Physical Exam  Vitals and nursing note reviewed. Constitutional:       General: She is not in acute distress. Appearance: She is well-developed. HENT:      Head: Normocephalic and atraumatic. Right Ear: Tympanic membrane, ear canal and external ear normal.      Left Ear: Tympanic membrane, ear canal and external ear normal.   Eyes:      General:         Right eye: No discharge. Left eye: No discharge. Pupils: Pupils are equal, round, and reactive to light. Neck:      Thyroid: No thyromegaly. Vascular: No carotid bruit. Cardiovascular:      Rate and Rhythm: Normal rate and regular rhythm. Heart sounds: Normal heart sounds. No murmur heard. No friction rub. No gallop. Pulmonary:      Effort: Pulmonary effort is normal. No respiratory distress. Breath sounds: Normal breath sounds. No wheezing or rales. Abdominal:      General: Bowel sounds are normal. There is no distension. Palpations: Abdomen is soft.  There is no mass.      Tenderness: There is no abdominal tenderness. There is no guarding or rebound. Musculoskeletal:         General: No tenderness. Normal range of motion. Cervical back: Normal range of motion and neck supple. Right lower leg: No edema. Left lower leg: No edema. Lymphadenopathy:      Cervical: No cervical adenopathy. Skin:     General: Skin is warm and dry. Findings: No rash. Neurological:      Mental Status: She is alert and oriented to person, place, and time. Deep Tendon Reflexes: Reflexes are normal and symmetric. Psychiatric:         Behavior: Behavior normal.       ASSESSMENT and PLAN  Diagnoses and all orders for this visit:    1. Medicare annual wellness visit, subsequent    2. Major depressive disorder, single episode, mild ([facility] [address]    3. Hyperlipidemia, unspecified hyperlipidemia type  -     CBC WITH AUTOMATED DIFF; Future  -     TSH 3RD GENERATION; Future  -     URINALYSIS W/ RFLX MICROSCOPIC; Future    4. Iron deficiency anemia, unspecified iron deficiency anemia type    5. Coronary artery calcification  -     METABOLIC PANEL, BASIC; Future  -     LIPID PANEL; Future  -     HEPATIC FUNCTION PANEL; Future    6.  Ascending aorta dilatation (HCC)

## 2022-08-16 NOTE — PATIENT INSTRUCTIONS

## 2022-08-16 NOTE — PROGRESS NOTES
This is the Subsequent Medicare Annual Wellness Exam, performed 12 months or more after the Initial AWV or the last Subsequent AWV    I have reviewed the patient's medical history in detail and updated the computerized patient record. Assessment/Plan   Education and counseling provided:  Are appropriate based on today's review and evaluation    1. Medicare annual wellness visit, subsequent     Depression Risk Factor Screening     3 most recent PHQ Screens 8/16/2022   Little interest or pleasure in doing things Not at all   Feeling down, depressed, irritable, or hopeless Not at all   Total Score PHQ 2 0       Alcohol & Drug Abuse Risk Screen    Do you average more than 1 drink per night or more than 7 drinks a week:  No    On any one occasion in the past three months have you have had more than 3 drinks containing alcohol:  No          Functional Ability and Level of Safety    Hearing: Hearing is good. Activities of Daily Living: The home contains: no safety equipment. Patient does total self care      Ambulation: with no difficulty     Fall Risk:  Fall Risk Assessment, last 12 mths 8/16/2022   Able to walk? Yes   Fall in past 12 months? 0   Do you feel unsteady?  0   Are you worried about falling 0      Abuse Screen:  Patient is not abused       Cognitive Screening    Has your family/caregiver stated any concerns about your memory: no         Health Maintenance Due     Health Maintenance Due   Topic Date Due    Lipid Screen  07/29/2022       Patient Care Team   Patient Care Team:  Shannon Harmon MD as PCP - General  Shannon Harmon MD as PCP - [facility] Empaneled Provider  Sae Capellan MD (Cardiovascular Disease Physician)    History     Patient Active Problem List   Diagnosis Code    Iron deficiency anemia, unspecified D50.9    Symptomatic menopausal or female climacteric states N95.1    Benign neoplasm of colon D12.6    Dysthymic disorder F34.1    Allergic rhinitis, cause unspecified J30.9 Disorder of bone and cartilage M89.9, M94.9    Advance directive discussed with patient Z71.89    Mild depression F32. A    Major depressive disorder, single episode, mild (HCC) F32.0     No past medical history on file. Past Surgical History:   Procedure Laterality Date    ENDOSCOPY, COLON, DIAGNOSTIC      11, due 16     Current Outpatient Medications   Medication Sig Dispense Refill    citalopram (CELEXA) 20 mg tablet TAKE TWO TABLETS BY MOUTH DAILY 180 Tablet 1    rosuvastatin (CRESTOR) 20 mg tablet TAKE ONE TABLET BY MOUTH ONCE NIGHTLY 90 Tablet 1    aspirin delayed-release 81 mg tablet Take  by mouth daily. fexofenadine (ALLEGRA) 180 mg tablet Take  by mouth daily. ferrous sulfate 325 mg (65 mg iron) tablet Take 1 Tab by mouth Daily (before breakfast). (Patient taking differently: Take 325 mg by mouth two (2) times a week.) 30 Tab 3    triamcinolone (NASACORT AQ) 55 mcg nasal inhaler 2 Sprays daily. denosumab (PROLIA) 60 mg/mL injection 60 mg by SubCUTAneous route. multivitamin,tx-iron-ca-min (THERA-M w/ IRON) 27-0.4 mg tab Take 1 Tab by mouth daily. cholecalciferol (VITAMIN D3) (1000 Units /25 mcg) tablet Take  by mouth daily. CALCIUM CARBONATE/VITAMIN D3 (CALCIUM 600 + D PO) Take 2 Tabs by mouth daily. Allergies   Allergen Reactions    Benzocaine Rash     topical       Family History   Problem Relation Age of Onset    Hypertension Mother     Cancer Mother         breast, colon    Asthma Father     Heart Disease Father     Arthritis-rheumatoid Brother     Hypertension Brother     Other Brother         rare blood disorder     Social History     Tobacco Use    Smoking status: Former     Packs/day: 1.50     Years: 19.00     Pack years: 28.50     Types: Cigarettes     Quit date: 1999     Years since quittin.6    Smokeless tobacco: Never   Substance Use Topics    Alcohol use:  Yes     Alcohol/week: 2.0 standard drinks     Types: 2 Glasses of wine per week         Bryant Queen Huyen Graff MD

## 2022-08-19 DIAGNOSIS — Z87.891 HISTORY OF TOBACCO ABUSE: Primary | ICD-10-CM

## 2022-08-19 LAB
ALBUMIN SERPL-MCNC: 4.6 G/DL (ref 3.8–4.8)
ALP SERPL-CCNC: 81 IU/L (ref 44–121)
ALT SERPL-CCNC: 28 IU/L (ref 0–32)
APPEARANCE UR: CLEAR
AST SERPL-CCNC: 35 IU/L (ref 0–40)
BASOPHILS # BLD AUTO: 0 X10E3/UL (ref 0–0.2)
BASOPHILS NFR BLD AUTO: 1 %
BILIRUB DIRECT SERPL-MCNC: 0.18 MG/DL (ref 0–0.4)
BILIRUB SERPL-MCNC: 0.6 MG/DL (ref 0–1.2)
BILIRUB UR QL STRIP: NEGATIVE
BUN SERPL-MCNC: 10 MG/DL (ref 8–27)
BUN/CREAT SERPL: 13 (ref 12–28)
CALCIUM SERPL-MCNC: 9.8 MG/DL (ref 8.7–10.3)
CHLORIDE SERPL-SCNC: 100 MMOL/L (ref 96–106)
CHOLEST SERPL-MCNC: 131 MG/DL (ref 100–199)
CO2 SERPL-SCNC: 25 MMOL/L (ref 20–29)
COLOR UR: YELLOW
CREAT SERPL-MCNC: 0.76 MG/DL (ref 0.57–1)
EGFR: 84 ML/MIN/1.73
EOSINOPHIL # BLD AUTO: 0.2 X10E3/UL (ref 0–0.4)
EOSINOPHIL NFR BLD AUTO: 5 %
ERYTHROCYTE [DISTWIDTH] IN BLOOD BY AUTOMATED COUNT: 12.5 % (ref 11.7–15.4)
GLUCOSE SERPL-MCNC: 89 MG/DL (ref 65–99)
GLUCOSE UR QL STRIP: NEGATIVE
HCT VFR BLD AUTO: 42.8 % (ref 34–46.6)
HDLC SERPL-MCNC: 60 MG/DL
HGB BLD-MCNC: 14.2 G/DL (ref 11.1–15.9)
HGB UR QL STRIP: NEGATIVE
IMM GRANULOCYTES # BLD AUTO: 0 X10E3/UL (ref 0–0.1)
IMM GRANULOCYTES NFR BLD AUTO: 0 %
KETONES UR QL STRIP: NEGATIVE
LDLC SERPL CALC-MCNC: 60 MG/DL (ref 0–99)
LEUKOCYTE ESTERASE UR QL STRIP: NEGATIVE
LYMPHOCYTES # BLD AUTO: 1.3 X10E3/UL (ref 0.7–3.1)
LYMPHOCYTES NFR BLD AUTO: 31 %
MCH RBC QN AUTO: 30 PG (ref 26.6–33)
MCHC RBC AUTO-ENTMCNC: 33.2 G/DL (ref 31.5–35.7)
MCV RBC AUTO: 90 FL (ref 79–97)
MICRO URNS: ABNORMAL
MONOCYTES # BLD AUTO: 0.5 X10E3/UL (ref 0.1–0.9)
MONOCYTES NFR BLD AUTO: 13 %
NEUTROPHILS # BLD AUTO: 2 X10E3/UL (ref 1.4–7)
NEUTROPHILS NFR BLD AUTO: 50 %
NITRITE UR QL STRIP: NEGATIVE
PH UR STRIP: 8 [PH] (ref 5–7.5)
PLATELET # BLD AUTO: 190 X10E3/UL (ref 150–450)
POTASSIUM SERPL-SCNC: 4.1 MMOL/L (ref 3.5–5.2)
PROT SERPL-MCNC: 6.6 G/DL (ref 6–8.5)
PROT UR QL STRIP: NEGATIVE
RBC # BLD AUTO: 4.74 X10E6/UL (ref 3.77–5.28)
SODIUM SERPL-SCNC: 138 MMOL/L (ref 134–144)
SP GR UR STRIP: 1.01 (ref 1–1.03)
TRIGL SERPL-MCNC: 48 MG/DL (ref 0–149)
TSH SERPL DL<=0.005 MIU/L-ACNC: 2.84 UIU/ML (ref 0.45–4.5)
UROBILINOGEN UR STRIP-MCNC: 0.2 MG/DL (ref 0.2–1)
VLDLC SERPL CALC-MCNC: 11 MG/DL (ref 5–40)
WBC # BLD AUTO: 4.1 X10E3/UL (ref 3.4–10.8)

## 2022-08-31 ENCOUNTER — HOSPITAL ENCOUNTER (OUTPATIENT)
Dept: CT IMAGING | Age: 72
Discharge: HOME | End: 2022-08-31
Attending: INTERNAL MEDICINE
Payer: MEDICARE

## 2022-08-31 VITALS — WEIGHT: 133 LBS | BODY MASS INDEX: 22.16 KG/M2 | HEIGHT: 65 IN

## 2022-08-31 DIAGNOSIS — Z87.891 HISTORY OF TOBACCO ABUSE: ICD-10-CM

## 2022-08-31 PROCEDURE — 71271 CT THORAX LUNG CANCER SCR C-: CPT

## 2022-09-09 DIAGNOSIS — E78.2 MIXED HYPERLIPIDEMIA: Primary | ICD-10-CM

## 2022-09-12 RX ORDER — ROSUVASTATIN CALCIUM 20 MG/1
TABLET, COATED ORAL
Qty: 90 TABLET | Refills: 1 | Status: SHIPPED | OUTPATIENT
Start: 2022-09-12

## 2022-09-12 NOTE — TELEPHONE ENCOUNTER
Refilled per VO per Md    Future Appointments   Date Time Provider Karl Cook   10/11/2022  2:00 PM JOSHUA LONG BS AMB   10/11/2022  2:40 PM MD TED Edwards BS AMB   1/3/2023  2:00 PM MD GEOVANY Rosenbaum BS AMB

## 2022-09-14 DIAGNOSIS — F34.1 DYSTHYMIC DISORDER: ICD-10-CM

## 2022-09-14 RX ORDER — CITALOPRAM 20 MG/1
TABLET, FILM COATED ORAL
Qty: 180 TABLET | Refills: 1 | Status: SHIPPED | OUTPATIENT
Start: 2022-09-14

## 2022-09-14 NOTE — TELEPHONE ENCOUNTER
Pt last seen on 8/16/22. She is due to return in 3 months. Pt has appt with Dr Dawna Marinelli on 1/3/23.  Will forward to MD.

## 2022-09-14 NOTE — TELEPHONE ENCOUNTER
Requested Prescriptions     Pending Prescriptions Disp Refills    citalopram (CELEXA) 20 mg tablet 180 Tablet 1     [facility] [id number] - [address], [address]  [phone]

## 2022-10-11 ENCOUNTER — OFFICE VISIT (OUTPATIENT)
Dept: CARDIOLOGY CLINIC | Age: 72
End: 2022-10-11

## 2022-10-11 ENCOUNTER — ANCILLARY PROCEDURE (OUTPATIENT)
Dept: CARDIOLOGY CLINIC | Age: 72
End: 2022-10-11
Payer: MEDICARE

## 2022-10-11 VITALS
HEART RATE: 62 BPM | SYSTOLIC BLOOD PRESSURE: 105 MMHG | HEIGHT: 65 IN | BODY MASS INDEX: 22.49 KG/M2 | DIASTOLIC BLOOD PRESSURE: 64 MMHG | WEIGHT: 135 LBS | RESPIRATION RATE: 16 BRPM | OXYGEN SATURATION: 97 %

## 2022-10-11 VITALS
WEIGHT: 135 LBS | HEIGHT: 65 IN | DIASTOLIC BLOOD PRESSURE: 69 MMHG | SYSTOLIC BLOOD PRESSURE: 129 MMHG | BODY MASS INDEX: 22.49 KG/M2

## 2022-10-11 DIAGNOSIS — I71.21 ASCENDING AORTIC ANEURYSM: ICD-10-CM

## 2022-10-11 DIAGNOSIS — Z82.49 FAMILY HISTORY OF EARLY CAD: ICD-10-CM

## 2022-10-11 DIAGNOSIS — I71.21 ANEURYSM OF ASCENDING AORTA WITHOUT RUPTURE: ICD-10-CM

## 2022-10-11 DIAGNOSIS — E78.2 MIXED HYPERLIPIDEMIA: ICD-10-CM

## 2022-10-11 DIAGNOSIS — Z87.891 HISTORY OF TOBACCO USE: ICD-10-CM

## 2022-10-11 DIAGNOSIS — I35.1 NONRHEUMATIC AORTIC VALVE INSUFFICIENCY: Primary | ICD-10-CM

## 2022-10-11 DIAGNOSIS — I35.1 NONRHEUMATIC AORTIC VALVE INSUFFICIENCY: ICD-10-CM

## 2022-10-11 LAB
ECHO AO ASC DIAM: 4.1 CM
ECHO AO ASCENDING AORTA INDEX: 2.46 CM/M2
ECHO AO ROOT DIAM: 3.3 CM
ECHO AO ROOT INDEX: 1.98 CM/M2
ECHO AV PEAK GRADIENT: 13 MMHG
ECHO AV PEAK VELOCITY: 1.8 M/S
ECHO AV VELOCITY RATIO: 0.61
ECHO EST RA PRESSURE: 3 MMHG
ECHO LA DIAMETER INDEX: 2.22 CM/M2
ECHO LA DIAMETER: 3.7 CM
ECHO LA TO AORTIC ROOT RATIO: 1.12
ECHO LA VOL 2C: 50 ML (ref 22–52)
ECHO LA VOL 4C: 37 ML (ref 22–52)
ECHO LA VOL BP: 44 ML (ref 22–52)
ECHO LA VOL/BSA BIPLANE: 26 ML/M2 (ref 16–34)
ECHO LA VOLUME AREA LENGTH: 46 ML
ECHO LA VOLUME INDEX A2C: 30 ML/M2 (ref 16–34)
ECHO LA VOLUME INDEX A4C: 22 ML/M2 (ref 16–34)
ECHO LA VOLUME INDEX AREA LENGTH: 28 ML/M2 (ref 16–34)
ECHO LV E' LATERAL VELOCITY: 6 CM/S
ECHO LV E' SEPTAL VELOCITY: 7 CM/S
ECHO LV FRACTIONAL SHORTENING: 63 % (ref 28–44)
ECHO LV INTERNAL DIMENSION DIASTOLE INDEX: 2.57 CM/M2
ECHO LV INTERNAL DIMENSION DIASTOLIC: 4.3 CM (ref 3.9–5.3)
ECHO LV INTERNAL DIMENSION SYSTOLIC INDEX: 0.96 CM/M2
ECHO LV INTERNAL DIMENSION SYSTOLIC: 1.6 CM
ECHO LV IVSD: 0.9 CM (ref 0.6–0.9)
ECHO LV MASS 2D: 123.3 G (ref 67–162)
ECHO LV MASS INDEX 2D: 73.8 G/M2 (ref 43–95)
ECHO LV POSTERIOR WALL DIASTOLIC: 0.9 CM (ref 0.6–0.9)
ECHO LV RELATIVE WALL THICKNESS RATIO: 0.42
ECHO LVOT PEAK GRADIENT: 5 MMHG
ECHO LVOT PEAK VELOCITY: 1.1 M/S
ECHO MV "A" WAVE DURATION: 148.4 MSEC
ECHO MV A VELOCITY: 0.91 M/S
ECHO MV E DECELERATION TIME (DT): 243 MS
ECHO MV E VELOCITY: 0.71 M/S
ECHO MV E/A RATIO: 0.78
ECHO MV E/E' LATERAL: 11.83
ECHO MV E/E' RATIO (AVERAGED): 10.99
ECHO MV E/E' SEPTAL: 10.14
ECHO RIGHT VENTRICULAR SYSTOLIC PRESSURE (RVSP): 25 MMHG
ECHO RV BASAL DIMENSION: 2.9 CM
ECHO RV FREE WALL PEAK S': 15 CM/S
ECHO RV TAPSE: 2.3 CM (ref 1.7–?)
ECHO TV REGURGITANT MAX VELOCITY: 2.32 M/S
ECHO TV REGURGITANT PEAK GRADIENT: 22 MMHG

## 2022-10-11 PROCEDURE — 93010 ELECTROCARDIOGRAM REPORT: CPT | Performed by: INTERNAL MEDICINE

## 2022-10-11 PROCEDURE — 3017F COLORECTAL CA SCREEN DOC REV: CPT | Performed by: INTERNAL MEDICINE

## 2022-10-11 PROCEDURE — 93306 TTE W/DOPPLER COMPLETE: CPT | Performed by: INTERNAL MEDICINE

## 2022-10-11 PROCEDURE — G9717 DOC PT DX DEP/BP F/U NT REQ: HCPCS | Performed by: INTERNAL MEDICINE

## 2022-10-11 PROCEDURE — G8427 DOCREV CUR MEDS BY ELIG CLIN: HCPCS | Performed by: INTERNAL MEDICINE

## 2022-10-11 PROCEDURE — G8420 CALC BMI NORM PARAMETERS: HCPCS | Performed by: INTERNAL MEDICINE

## 2022-10-11 PROCEDURE — 1101F PT FALLS ASSESS-DOCD LE1/YR: CPT | Performed by: INTERNAL MEDICINE

## 2022-10-11 PROCEDURE — G9899 SCRN MAM PERF RSLTS DOC: HCPCS | Performed by: INTERNAL MEDICINE

## 2022-10-11 PROCEDURE — G8399 PT W/DXA RESULTS DOCUMENT: HCPCS | Performed by: INTERNAL MEDICINE

## 2022-10-11 PROCEDURE — 99214 OFFICE O/P EST MOD 30 MIN: CPT | Performed by: INTERNAL MEDICINE

## 2022-10-11 PROCEDURE — G8536 NO DOC ELDER MAL SCRN: HCPCS | Performed by: INTERNAL MEDICINE

## 2022-10-11 PROCEDURE — 1123F ACP DISCUSS/DSCN MKR DOCD: CPT | Performed by: INTERNAL MEDICINE

## 2022-10-11 PROCEDURE — 1090F PRES/ABSN URINE INCON ASSESS: CPT | Performed by: INTERNAL MEDICINE

## 2022-10-11 NOTE — PROGRESS NOTES
[facility] [place]: [facility] [employer]  [phone]    History of Present Illness:   Ms. Francesco Anders is a 80 yo F with history of tobacco use, quit several years ago, family history of early coronary artery disease, iron deficiency anemia. CT scan of the chest on 08/06/2020 that noted no suspicious pulmonary nodules, but did note a calcified granuloma in the right upper lobe. 9/2020 echo demonstrates LV function was normal, mild to moderate regurgitation of the aortic valve, the aorta was mildly dilated at 3.8 cm (4.1 - 4.2 cm by CT scan). 9/11/20 stress echo was normal.    Since her last visit, she continues to do well. She denies any exertional chest pain, shortness of breath, palpitations, lightheadedness or dizziness. No major changes in health. She continues to exercise regularly going to the gym and working with the . She is compensated on exam with clear lungs. She does have a II-III/VI diastolic murmur at the left sternal border. Her echocardiogram continues to show preserved LV function with just mild to moderate aortic regurgitation and her aorta is at 4.1 cm. Assessment and Plan:   1. Aortic regurgitation. Mild to moderate in severity. She is compensated and this just needs regular surveillance. 2. Ascending aortic aneurysm. 4.1 cm by echocardiogram and 4.1 - 4.2 by CT scan. Continue serial echocardiograms to reduce radiation exposure and CT scan if there is major change. Will potentially get a repeat in one year. Will have her follow back in six months. 3. Mixed hyperlipidemia. Tolerating statin. 4. History of tobacco use. Quit many years ago. 5. Family history of early coronary artery disease. She  has no past medical history on file. All other systems negative except as above. PE  Vitals:    10/11/22 1445   BP: 105/64   Pulse: 62   Resp: 16   SpO2: 97%   Weight: 135 lb (61.2 kg)   Height: 5' 5\" (1.651 m)      Body mass index is 22.47 kg/m².    General appearance - alert, well appearing, and in no distress  Mental status - affect appropriate to mood  Eyes - sclera anicteric, moist mucous membranes  Neck - supple, no JVD  Chest - clear to auscultation, no wheezes, rales or rhonchi  Heart - normal rate, regular rhythm, normal S1, S2, III/VI diastolic murmur LUSB  Abdomen - soft, nontender, nondistended, no masses or organomegaly  Neurological -no focal deficit  Extremities - peripheral pulses normal, no pedal edema      Recent Labs:  Lab Results   Component Value Date/Time    Cholesterol, total 131 2022 08:57 AM    HDL Cholesterol 60 2022 08:57 AM    LDL, calculated 60 2022 08:57 AM    LDL, calculated 83 2020 08:14 AM    Triglyceride 48 2022 08:57 AM    CHOL/HDL Ratio 3.2 07/15/2010 08:35 AM     Lab Results   Component Value Date/Time    Creatinine 0.76 2022 08:57 AM     Lab Results   Component Value Date/Time    BUN 10 2022 08:57 AM     Lab Results   Component Value Date/Time    Potassium 4.1 2022 08:57 AM     No results found for: HBA1C, [id number], [id number]  Lab Results   Component Value Date/Time    HGB 14.2 2022 08:57 AM     Lab Results   Component Value Date/Time    PLATELET 924  08:57 AM       Reviewed:  History reviewed. No pertinent past medical history. Social History     Tobacco Use   Smoking Status Former    Packs/day: 1.50    Years: 19.00    Pack years: 28.50    Types: Cigarettes    Quit date: 1999    Years since quittin.7   Smokeless Tobacco Never     Social History     Substance and Sexual Activity   Alcohol Use Yes    Alcohol/week: 2.0 standard drinks    Types: 2 Glasses of wine per week     Allergies   Allergen Reactions    Benzocaine Rash     topical       Current Outpatient Medications   Medication Sig    citalopram (CELEXA) 20 mg tablet TAKE TWO TABLETS BY MOUTH DAILY.     rosuvastatin (CRESTOR) 20 mg tablet TAKE ONE TABLET BY MOUTH ONCE NIGHTLY    aspirin delayed-release 81 mg tablet Take  by mouth daily. fexofenadine (ALLEGRA) 180 mg tablet Take  by mouth daily. ferrous sulfate 325 mg (65 mg iron) tablet Take 1 Tab by mouth Daily (before breakfast). (Patient taking differently: Take 325 mg by mouth two (2) times a week.)    triamcinolone (NASACORT AQ) 55 mcg nasal inhaler 2 Sprays daily. denosumab (PROLIA) 60 mg/mL injection 60 mg by SubCUTAneous route. multivitamin,tx-iron-ca-min (THERA-M w/ IRON) 27-0.4 mg tab Take 1 Tab by mouth daily. cholecalciferol (VITAMIN D3) (1000 Units /25 mcg) tablet Take  by mouth daily. CALCIUM CARBONATE/VITAMIN D3 (CALCIUM 600 + D PO) Take 2 Tabs by mouth daily. No current facility-administered medications for this visit.        Esther De Luna MD  [facility] heart and Vascular [place]  [address] [facility] [address]  [facility], [address]

## 2023-01-03 ENCOUNTER — OFFICE VISIT (OUTPATIENT)
Dept: INTERNAL MEDICINE CLINIC | Age: 73
End: 2023-01-03
Payer: MEDICARE

## 2023-01-03 VITALS
SYSTOLIC BLOOD PRESSURE: 128 MMHG | WEIGHT: 136.2 LBS | BODY MASS INDEX: 22.69 KG/M2 | RESPIRATION RATE: 18 BRPM | HEART RATE: 70 BPM | DIASTOLIC BLOOD PRESSURE: 74 MMHG | TEMPERATURE: 97.1 F | HEIGHT: 65 IN | OXYGEN SATURATION: 97 %

## 2023-01-03 DIAGNOSIS — T75.3XXD MOTION SICKNESS, SUBSEQUENT ENCOUNTER: ICD-10-CM

## 2023-01-03 DIAGNOSIS — D50.9 IRON DEFICIENCY ANEMIA, UNSPECIFIED IRON DEFICIENCY ANEMIA TYPE: ICD-10-CM

## 2023-01-03 DIAGNOSIS — M81.0 AGE-RELATED OSTEOPOROSIS WITHOUT CURRENT PATHOLOGICAL FRACTURE: ICD-10-CM

## 2023-01-03 DIAGNOSIS — H93.13 TINNITUS OF BOTH EARS: ICD-10-CM

## 2023-01-03 DIAGNOSIS — I77.810 THORACIC AORTIC ECTASIA (HCC): ICD-10-CM

## 2023-01-03 DIAGNOSIS — F32.0 MAJOR DEPRESSIVE DISORDER, SINGLE EPISODE, MILD (HCC): Primary | ICD-10-CM

## 2023-01-03 PROCEDURE — G8536 NO DOC ELDER MAL SCRN: HCPCS | Performed by: STUDENT IN AN ORGANIZED HEALTH CARE EDUCATION/TRAINING PROGRAM

## 2023-01-03 PROCEDURE — 1090F PRES/ABSN URINE INCON ASSESS: CPT | Performed by: STUDENT IN AN ORGANIZED HEALTH CARE EDUCATION/TRAINING PROGRAM

## 2023-01-03 PROCEDURE — G9899 SCRN MAM PERF RSLTS DOC: HCPCS | Performed by: STUDENT IN AN ORGANIZED HEALTH CARE EDUCATION/TRAINING PROGRAM

## 2023-01-03 PROCEDURE — G0463 HOSPITAL OUTPT CLINIC VISIT: HCPCS | Performed by: STUDENT IN AN ORGANIZED HEALTH CARE EDUCATION/TRAINING PROGRAM

## 2023-01-03 PROCEDURE — 1101F PT FALLS ASSESS-DOCD LE1/YR: CPT | Performed by: STUDENT IN AN ORGANIZED HEALTH CARE EDUCATION/TRAINING PROGRAM

## 2023-01-03 PROCEDURE — G8427 DOCREV CUR MEDS BY ELIG CLIN: HCPCS | Performed by: STUDENT IN AN ORGANIZED HEALTH CARE EDUCATION/TRAINING PROGRAM

## 2023-01-03 PROCEDURE — G9717 DOC PT DX DEP/BP F/U NT REQ: HCPCS | Performed by: STUDENT IN AN ORGANIZED HEALTH CARE EDUCATION/TRAINING PROGRAM

## 2023-01-03 PROCEDURE — 99214 OFFICE O/P EST MOD 30 MIN: CPT | Performed by: STUDENT IN AN ORGANIZED HEALTH CARE EDUCATION/TRAINING PROGRAM

## 2023-01-03 PROCEDURE — G8420 CALC BMI NORM PARAMETERS: HCPCS | Performed by: STUDENT IN AN ORGANIZED HEALTH CARE EDUCATION/TRAINING PROGRAM

## 2023-01-03 PROCEDURE — 3017F COLORECTAL CA SCREEN DOC REV: CPT | Performed by: STUDENT IN AN ORGANIZED HEALTH CARE EDUCATION/TRAINING PROGRAM

## 2023-01-03 RX ORDER — ONDANSETRON 8 MG/1
8 TABLET, ORALLY DISINTEGRATING ORAL
Qty: 10 TABLET | Refills: 0 | Status: SHIPPED | OUTPATIENT
Start: 2023-01-03

## 2023-01-03 RX ORDER — LANOLIN ALCOHOL/MO/W.PET/CERES
325 CREAM (GRAM) TOPICAL 2 TIMES WEEKLY
Qty: 90 TABLET | Refills: 0
Start: 2023-01-06

## 2023-01-03 NOTE — PROGRESS NOTES
Porsche Kennedy is a 81y.o. year old female who is a new patient to me today (23). She was previous followed by Dr Yasmani Sosa. Assessment & Plan:   1. Major depressive disorder, single episode, mild (HCC)  Assessment & Plan:  Controlled, cont celexa   2. Tinnitus of both ears  Comments:  will refer to audiology for further eval and mgmt  Orders:  -     REFERRAL TO AUDIOLOGY  3. Motion sickness, subsequent encounter  Assessment & Plan: Will prescribe PRN zofran to take on flights for nausea. She is getting drowsy with dramamine. Can also try meclizine in the future. Orders:  -     ondansetron (ZOFRAN ODT) 8 mg disintegrating tablet; Take 1 Tablet by mouth every eight (8) hours as needed for Nausea or Vomiting., Normal, Disp-10 Tablet, R-0  4. Thoracic aortic ectasia (HCC)  Comments:  followed closely by Dr Deena Smith  5. Iron deficiency anemia, unspecified iron deficiency anemia type  Assessment & Plan:  Cont iron twice a week. Will repeat iron panel with next labs. I suspect her LAI is related to her colon polyp history    6. Age-related osteoporosis without current pathological fracture  Assessment & Plan:  Continue to follow with rheumatology for management. Health Maintenance   Flu vaccine: [employer]  COVID vaccine:  [facility]  Tetanus vaccine:  Shingles vaccine:  Pneumonia vaccine:  Cervical cancer screening:  Breast cancer screening:  Colon cancer screening:  DEXA:  Lung cancer screenin2022  Hep C:  Lipid:  DM: Healthcare decision maker:  ACP: working on this d      RTC  - awv 22  Subjective:   Philip Salgado was seen today for Establish Care    Tinnitis - this is not new but is getting louder. She feels her hearing is worsening. Depression  - celexa works well    HLD, CAD  - rosuvastatin  - asa 81mg    Aortic Regurgitation  Ascending Aortic Aneurysm  - Dr Tono Mueller  - 2020 she was found to have anemia, she was not bleeding.  She has had issues with anemia when she was perimenopausal due to menorrhagia. She also has as a family history of colon cancer.   - FOBT negative 7/2020  - Colonoscopy 4/2022 Dr Lazarus Plenty multiple polyps q3yr repeat   - iron supplement twice a week     Osteoporosis  - Rheumatologist Dr Carola Olson  - was on prolia, now on evinity. - she will ask him to check her vitamin D levels, she is taking supplement     Constipation - started using fiber capsule     Motion sickness when she flies. This is getting worse with age. Threw up on a flight in Dec coming back from [place]. Flies a couple times a year. Care Team:  Cardiology Dr Nai Olson    Review of Systems   All other systems reviewed and are negative. PMHx    Patient Active Problem List   Diagnosis Code    Iron deficiency anemia, unspecified D50.9    Symptomatic menopausal or female climacteric states N95.1    Benign neoplasm of colon D12.6    Dysthymic disorder F34.1    Allergic rhinitis, cause unspecified J30.9    Disorder of bone and cartilage M89.9, M94.9    Mild depression F32. A    Major depressive disorder, single episode, mild ([facility] [address] F32.0       Prior to Admission medications    Medication Sig Start Date End Date Taking? Authorizing Provider   citalopram (CELEXA) 20 mg tablet TAKE TWO TABLETS BY MOUTH DAILY. 9/14/22  Yes Don Hanson MD   rosuvastatin (CRESTOR) 20 mg tablet TAKE ONE TABLET BY MOUTH ONCE NIGHTLY 9/12/22  Yes Jenny Weaver MD   aspirin delayed-release 81 mg tablet Take  by mouth daily. Yes Provider, Historical   fexofenadine (ALLEGRA) 180 mg tablet Take  by mouth daily. Yes Provider, Historical   ferrous sulfate 325 mg (65 mg iron) tablet Take 1 Tab by mouth Daily (before breakfast). Patient taking differently: Take 325 mg by mouth two (2) times a week. 7/27/20  Yes Fran Blank MD   triamcinolone (NASACORT AQ) 55 mcg nasal inhaler 2 Sprays daily. Yes Provider, Historical   multivitamin,tx-iron-ca-min (THERA-M w/ IRON) 27-0.4 mg tab Take 1 Tab by mouth daily. Yes Provider, Historical   cholecalciferol (VITAMIN D3) (1000 Units /25 mcg) tablet Take  by mouth daily. Yes Provider, Historical   CALCIUM CARBONATE/VITAMIN D3 (CALCIUM 600 + D PO) Take 2 Tabs by mouth daily. 2/16/10  Yes Provider, Historical   denosumab (PROLIA) 60 mg/mL injection 60 mg by SubCUTAneous route. Patient not taking: Reported on 1/3/2023    Provider, Historical       The following sections were reviewed & updated as appropriate: Problem List, Allergies, PMH, PSH, FH, and SH. Objective:   Visit Vitals  /74   Pulse 70   Temp 97.1 °F (36.2 °C) (Temporal)   Resp 18   Ht 5' 5\" (1.651 m)   Wt 136 lb 3.2 oz (61.8 kg)   SpO2 97%   BMI 22.66 kg/m²       Physical Exam  Eyes:      Extraocular Movements: Extraocular movements intact. Cardiovascular:      Rate and Rhythm: Normal rate and regular rhythm. Heart sounds: No murmur heard. Pulmonary:      Effort: Pulmonary effort is normal. No respiratory distress. Abdominal:      General: Bowel sounds are normal.      Palpations: Abdomen is soft. Musculoskeletal:      Right lower leg: No edema. Left lower leg: No edema. Neurological:      General: No focal deficit present. Mental Status: She is alert.    Psychiatric:         Mood and Affect: Mood normal.         Behavior: Behavior normal.            Louisa Camejo MD

## 2023-01-03 NOTE — PROGRESS NOTES
Chief Complaint   Patient presents with    Establish Care     Blood pressure 128/74, pulse 70, temperature 97.1 °F (36.2 °C), temperature source Temporal, resp. rate 18, height 5' 5\" (1.651 m), weight 136 lb 3.2 oz (61.8 kg), SpO2 97 %.

## 2023-01-03 NOTE — PATIENT INSTRUCTIONS
[facility]   [address] [facility] [address], [address]  [phone]    [facility]  [address] [facility] [place], [place]  [facility], [address]  [address] [facility]  [address]  [place], [address] [facility]  [phone]

## 2023-02-15 ENCOUNTER — OFFICE VISIT (OUTPATIENT)
Dept: INTERNAL MEDICINE CLINIC | Age: 73
End: 2023-02-15
Payer: MEDICARE

## 2023-02-15 VITALS
RESPIRATION RATE: 20 BRPM | OXYGEN SATURATION: 98 % | TEMPERATURE: 97.8 F | HEART RATE: 56 BPM | BODY MASS INDEX: 22.29 KG/M2 | SYSTOLIC BLOOD PRESSURE: 125 MMHG | HEIGHT: 65 IN | DIASTOLIC BLOOD PRESSURE: 81 MMHG | WEIGHT: 133.8 LBS

## 2023-02-15 DIAGNOSIS — S39.012A LUMBOSACRAL STRAIN, INITIAL ENCOUNTER: Primary | ICD-10-CM

## 2023-02-15 DIAGNOSIS — M75.41 ROTATOR CUFF IMPINGEMENT SYNDROME, RIGHT: ICD-10-CM

## 2023-02-15 PROCEDURE — G0463 HOSPITAL OUTPT CLINIC VISIT: HCPCS | Performed by: STUDENT IN AN ORGANIZED HEALTH CARE EDUCATION/TRAINING PROGRAM

## 2023-02-15 NOTE — PROGRESS NOTES
Adelina Abbott is a 81 y.o. female who was seen in clinic today (2/18/2023) for an acute visit. Assessment & Plan:   Below is the assessment and plan developed based on review of pertinent history, physical exam, labs, studies, and medications. 1. Lumbosacral strain, initial encounter  Comments:  discussed ice, heat, stretching. refer to PT given, she will use if not improving with home stretching. she continues to be very active at the gym  Orders:  -     REFERRAL TO PHYSICAL THERAPY  2. Rotator cuff impingement syndrome, right  Comments:  she also feels she injured her R shoudler at the gym and feels some pain with certain activities. consistent with impingement. stretches given. PT if refractory  Orders:  -     REFERRAL TO PHYSICAL THERAPY    On this day 02/18/23  I have spent 23 minutes reviewing previous notes, test results and face to face with the patient discussing the diagnosis and importance of compliance with the treatment plan as well as documenting on the day of the visit. RTC: 8/2023 as scheduled or PRN sooner    Subjective:   Gabi Zhang was seen today for Back Pain    Back Pain  - known thoracic DJD which was seen on CT scan a couple years ago  - she has pain at the midback paraspinal muscles that has been going on for months that she can not get rid of  - last week she fell out of a yoga pose and her left > right low back became very tight and painful. She used the heating bad a lot and it loosened up. She expected it to get better but it is not resolved. - when she lies in bed in the AM when she wakes up she feels like her whole back side of her body tenses up when she first moves. When she is out of bed drinking coffee the same thing happens. This is not painful but weird. She does cardio and weight work outs twice a week, she wants to increase to TIW but sometimes \"her body tells her know\". She does gentle yoga twice a week as well.  She has been avoiding core fusion class because that is how she hurt her back. She is up and down during the day, no prolonged sitting. Patient Active Problem List   Diagnosis Code    Iron deficiency anemia, unspecified D50.9    Symptomatic menopausal or female climacteric states N95.1    Benign neoplasm of colon D12.6    Dysthymic disorder F34.1    Allergic rhinitis, cause unspecified J30.9    Age-related osteoporosis without current pathological fracture M81.0    Mild depression F32. A    Major depressive disorder, single episode, mild (HCC) F32.0    Thoracic aortic ectasia (HCC) I77.810    Motion sickness, subsequent encounter T75. 3XXD       Prior to Admission medications    Medication Sig Start Date End Date Taking? Authorizing Provider   romosozumab-aqqg (EVENITY SC) by SubCUTAneous route every thirty (30) days. Yes Provider, Historical   ondansetron (ZOFRAN ODT) 8 mg disintegrating tablet Take 1 Tablet by mouth every eight (8) hours as needed for Nausea or Vomiting. 1/3/23  Yes Roshan Brunner MD   ferrous sulfate 325 mg (65 mg iron) tablet Take 1 Tablet by mouth two (2) times a week. 1/6/23  Yes Roshan Brunner MD   citalopram (CELEXA) 20 mg tablet TAKE TWO TABLETS BY MOUTH DAILY. 9/14/22  Yes Roshan Brunner MD   rosuvastatin (CRESTOR) 20 mg tablet TAKE ONE TABLET BY MOUTH ONCE NIGHTLY 9/12/22  Yes Ryne Nichols MD   aspirin delayed-release 81 mg tablet Take  by mouth daily. Yes Provider, Historical   fexofenadine (ALLEGRA) 180 mg tablet Take  by mouth daily. Yes Provider, Historical   triamcinolone (NASACORT AQ) 55 mcg nasal inhaler 2 Sprays daily. Yes Provider, Historical   CALCIUM CARBONATE/VITAMIN D3 (CALCIUM 600 + D PO) Take 2 Tabs by mouth daily. 2/16/10  Yes Provider, Historical   multivitamin,tx-iron-ca-min (THERA-M w/ IRON) 27-0.4 mg tab Take 1 Tab by mouth daily. 2/18/23  Provider, Historical   cholecalciferol (VITAMIN D3) (1000 Units /25 mcg) tablet Take 2,000 Units by mouth daily.   2/18/23  Provider, Historical Objective:   Visit Vitals  /81   Pulse (!) 56   Temp 97.8 °F (36.6 °C) (Temporal)   Resp 20   Ht 5' 5\" (1.651 m)   Wt 133 lb 12.8 oz (60.7 kg)   SpO2 98%   BMI 22.27 kg/m²       Physical Exam  Eyes:      Extraocular Movements: Extraocular movements intact. Pulmonary:      Effort: Pulmonary effort is normal.   Musculoskeletal:      Comments: No TTP or mass on back. Skin:     Findings: No bruising or erythema. Neurological:      General: No focal deficit present. Mental Status: She is alert. Psychiatric:         Mood and Affect: Mood normal.         Behavior: Behavior normal.         Advised her to call back or return to office if symptoms worsen/change/persist.  Discussed expected course/resolution/complications of diagnosis in detail with patient.     Daphne Goddard MD

## 2023-02-15 NOTE — PROGRESS NOTES
States lower back pain has been going on x3 weeks. Has tried heating pad and Advil with little relief. Here today for further evaluation.

## 2023-03-11 DIAGNOSIS — F34.1 DYSTHYMIC DISORDER: ICD-10-CM

## 2023-03-11 DIAGNOSIS — E78.2 MIXED HYPERLIPIDEMIA: ICD-10-CM

## 2023-03-12 RX ORDER — CITALOPRAM 40 MG/1
40 TABLET, FILM COATED ORAL DAILY
Qty: 90 TABLET | Refills: 3 | Status: SHIPPED | OUTPATIENT
Start: 2023-03-12

## 2023-03-13 RX ORDER — ROSUVASTATIN CALCIUM 20 MG/1
20 TABLET, COATED ORAL
Qty: 90 TABLET | Refills: 0 | Status: SHIPPED | OUTPATIENT
Start: 2023-03-13

## 2023-03-13 NOTE — TELEPHONE ENCOUNTER
Requested Prescriptions     Signed Prescriptions Disp Refills    rosuvastatin (CRESTOR) 20 mg tablet 90 Tablet 0     Sig: Take 1 Tablet by mouth nightly. PCP follows cholesterol labs. Please send future refill request to PCP.      Authorizing Provider: Jeison Santana     Ordering User: Marissa Benedict     Verbal order per Dr. Neil Sanchez.     Future Appointments   Date Time Provider Karl Cook   4/17/2023  2:40 PM MD TED Landeros BS AMB   8/23/2023  9:00 AM MD GEOVANY Hebert BS AMB

## 2023-03-26 ENCOUNTER — PATIENT MESSAGE (OUTPATIENT)
Dept: INTERNAL MEDICINE CLINIC | Age: 73
End: 2023-03-26

## 2023-03-28 ENCOUNTER — OFFICE VISIT (OUTPATIENT)
Dept: INTERNAL MEDICINE CLINIC | Age: 73
End: 2023-03-28
Payer: MEDICARE

## 2023-03-28 VITALS
SYSTOLIC BLOOD PRESSURE: 130 MMHG | HEIGHT: 65 IN | WEIGHT: 137.2 LBS | DIASTOLIC BLOOD PRESSURE: 80 MMHG | RESPIRATION RATE: 16 BRPM | BODY MASS INDEX: 22.86 KG/M2 | OXYGEN SATURATION: 96 % | HEART RATE: 72 BPM | TEMPERATURE: 97.5 F

## 2023-03-28 DIAGNOSIS — R05.8 POST-VIRAL COUGH SYNDROME: Primary | ICD-10-CM

## 2023-03-28 PROCEDURE — 1101F PT FALLS ASSESS-DOCD LE1/YR: CPT | Performed by: INTERNAL MEDICINE

## 2023-03-28 PROCEDURE — 3017F COLORECTAL CA SCREEN DOC REV: CPT | Performed by: INTERNAL MEDICINE

## 2023-03-28 PROCEDURE — 99214 OFFICE O/P EST MOD 30 MIN: CPT | Performed by: INTERNAL MEDICINE

## 2023-03-28 PROCEDURE — G8536 NO DOC ELDER MAL SCRN: HCPCS | Performed by: INTERNAL MEDICINE

## 2023-03-28 PROCEDURE — G9899 SCRN MAM PERF RSLTS DOC: HCPCS | Performed by: INTERNAL MEDICINE

## 2023-03-28 PROCEDURE — 1090F PRES/ABSN URINE INCON ASSESS: CPT | Performed by: INTERNAL MEDICINE

## 2023-03-28 PROCEDURE — G8427 DOCREV CUR MEDS BY ELIG CLIN: HCPCS | Performed by: INTERNAL MEDICINE

## 2023-03-28 PROCEDURE — G8420 CALC BMI NORM PARAMETERS: HCPCS | Performed by: INTERNAL MEDICINE

## 2023-03-28 PROCEDURE — G0463 HOSPITAL OUTPT CLINIC VISIT: HCPCS | Performed by: INTERNAL MEDICINE

## 2023-03-28 PROCEDURE — G9717 DOC PT DX DEP/BP F/U NT REQ: HCPCS | Performed by: INTERNAL MEDICINE

## 2023-03-28 RX ORDER — METHYLPREDNISOLONE 4 MG/1
4 TABLET ORAL
Qty: 1 DOSE PACK | Refills: 0 | Status: SHIPPED | OUTPATIENT
Start: 2023-03-28

## 2023-03-28 NOTE — PROGRESS NOTES
Acute Care Note    Camelia Ronquillo is 81 y.o. female. she presents for evaluation of Cough (Patient stated last covid test was last week. Negative.)      COUGH  Mely Stevens is here to talk about a cough. This is a new problem. Symptoms began 7 days ago, unchanged since that time. The cough is non-productive, without wheezing, dyspnea or hemoptysis and is aggravated by nothing. Associated symptoms include:none. She denies: fever, chills. She has tried no specific therapy. Of note, she has been on augmentin for a dental infection (injury while having dental work). She notes that she will finish this in the coming days. Prior to Admission medications    Medication Sig Start Date End Date Taking? Authorizing Provider   rosuvastatin (CRESTOR) 20 mg tablet Take 1 Tablet by mouth nightly. PCP follows cholesterol labs. Please send future refill request to PCP. 3/13/23  Yes Amrit Cordova MD   citalopram (CELEXA) 40 mg tablet Take 1 Tablet by mouth daily. Note tablet strength change 3/12/23  Yes Mica Edward MD   romosozumab-aqqg [facility]) by SubCUTAneous route every thirty (30) days. Yes Provider, Historical   ondansetron (ZOFRAN ODT) 8 mg disintegrating tablet Take 1 Tablet by mouth every eight (8) hours as needed for Nausea or Vomiting. 1/3/23  Yes Miac Edward MD   ferrous sulfate 325 mg (65 mg iron) tablet Take 1 Tablet by mouth two (2) times a week. 1/6/23  Yes Mica Edward MD   aspirin delayed-release 81 mg tablet Take  by mouth daily. Yes Provider, Historical   fexofenadine (ALLEGRA) 180 mg tablet Take  by mouth daily. Yes Provider, Historical   triamcinolone (NASACORT AQ) 55 mcg nasal inhaler 2 Sprays daily. Yes Provider, Historical   CALCIUM CARBONATE/VITAMIN D3 (CALCIUM 600 + D PO) Take 2 Tabs by mouth daily.  2/16/10  Yes Provider, Historical         Patient Active Problem List   Diagnosis Code    Iron deficiency anemia, unspecified D50.9    Symptomatic menopausal or female climacteric states N95.1    Benign neoplasm of colon D12.6    Dysthymic disorder F34.1    Allergic rhinitis, cause unspecified J30.9    Age-related osteoporosis without current pathological fracture M81.0    Mild depression F32. A    Major depressive disorder, single episode, mild (HCC) F32.0    Thoracic aortic ectasia (HCC) I77.810    Motion sickness, subsequent encounter T75. 3XXD         Review of Systems   Constitutional: Negative. Respiratory:  Positive for cough. Cardiovascular:  Negative for chest pain. Musculoskeletal: Negative. Visit Vitals  /80   Pulse 72   Temp 97.5 °F (36.4 °C) (Temporal)   Resp 16   Ht 5' 5\" (1.651 m)   Wt 137 lb 3.2 oz (62.2 kg)   SpO2 96%   BMI 22.83 kg/m²       Physical Exam  Constitutional:       Appearance: Normal appearance. Cardiovascular:      Rate and Rhythm: Normal rate and regular rhythm. Pulses: Normal pulses. Heart sounds: Normal heart sounds. Pulmonary:      Effort: Pulmonary effort is normal.      Breath sounds: Normal breath sounds. Neurological:      Mental Status: She is alert. ASSESSMENT/PLAN  Diagnoses and all orders for this visit:    1. Post-viral cough syndrome  -     methylPREDNISolone (MEDROL DOSEPACK) 4 mg tablet; Take 1 Tablet by mouth Specific Days and Specific Times. Advised the patient to call back or return to office if symptoms worsen/change/persist.   Discussed expected course/resolution/complications of diagnosis in detail with patient. Medication risks/benefits/costs/interactions/alternatives discussed with patient. The patient was given an after visit summary which includes diagnoses, current medications, & vitals. They expressed understanding with the diagnosis and plan.

## 2023-03-28 NOTE — PROGRESS NOTES
Chief Complaint   Patient presents with    Cough     Patient stated last covid test was last week. Negative. Reviewed record in preparation for visit and have obtained necessary documentation. Identified pt with two pt identifiers(name and ). There are no preventive care reminders to display for this patient. Chief Complaint   Patient presents with    Cough     Patient stated last covid test was last week. Negative.         Wt Readings from Last 3 Encounters:   23 137 lb 3.2 oz (62.2 kg)   02/15/23 133 lb 12.8 oz (60.7 kg)   23 136 lb 3.2 oz (61.8 kg)     Temp Readings from Last 3 Encounters:   23 97.5 °F (36.4 °C) (Temporal)   02/15/23 97.8 °F (36.6 °C) (Temporal)   23 97.1 °F (36.2 °C) (Temporal)     BP Readings from Last 3 Encounters:   23 130/80   02/15/23 125/81   23 128/74     Pulse Readings from Last 3 Encounters:   23 72   02/15/23 (!) 56   23 70           Learning Assessment:  :     Learning Assessment 2017 5/15/2014   PRIMARY LEARNER Patient Patient   HIGHEST LEVEL OF EDUCATION - PRIMARY LEARNER  - 4 YEARS OF COLLEGE   BARRIERS PRIMARY LEARNER - NONE   CO-LEARNER CAREGIVER - No   PRIMARY LANGUAGE ENGLISH ENGLISH   LEARNER PREFERENCE PRIMARY OTHER (COMMENT) OTHER (COMMENT)   ANSWERED BY patient patient   RELATIONSHIP SELF SELF       Depression Screening:  :     3 most recent PHQ Screens 3/28/2023   Little interest or pleasure in doing things Not at all   Feeling down, depressed, irritable, or hopeless Not at all   Total Score PHQ 2 0   Trouble falling or staying asleep, or sleeping too much -   Feeling tired or having little energy -   Poor appetite, weight loss, or overeating -   Feeling bad about yourself - or that you are a failure or have let yourself or your family down -   Trouble concentrating on things such as school, work, reading, or watching TV -   Moving or speaking so slowly that other people could have noticed; or the opposite being so fidgety that others notice -   Thoughts of being better off dead, or hurting yourself in some way -   PHQ 9 Score -   How difficult have these problems made it for you to do your work, take care of your home and get along with others -       Fall Risk Assessment:  :     Fall Risk Assessment, last 12 mths 3/28/2023   Able to walk? Yes   Fall in past 12 months? 0   Do you feel unsteady? 0   Are you worried about falling 0       Abuse Screening:  :     Abuse Screening Questionnaire 3/28/2023 1/3/2023   Do you ever feel afraid of your partner? N N   Are you in a relationship with someone who physically or mentally threatens you? N N   Is it safe for you to go home? Y Y       Coordination of Care Questionnaire:  :     1) Have you been to an emergency room, urgent care clinic since your last visit? no   Hospitalized since your last visit? no             2) Have you seen or consulted any other health care providers outside of [address] [place] [address] since your last visit? no  (Include any pap smears or colon screenings in this section.)    3) Do you have an Advance Directive on file? no    4) Are you interested in receiving information on Advance Directives? NO      Patient is accompanied by self I have received verbal consent from Rabia Moser to discuss any/all medical information while they are present in the room. Reviewed record  In preparation for visit and have obtained necessary documentation.

## 2023-05-02 ENCOUNTER — OFFICE VISIT (OUTPATIENT)
Dept: CARDIOLOGY CLINIC | Age: 73
End: 2023-05-02
Payer: MEDICARE

## 2023-05-02 VITALS
BODY MASS INDEX: 22.99 KG/M2 | OXYGEN SATURATION: 97 % | SYSTOLIC BLOOD PRESSURE: 110 MMHG | HEART RATE: 66 BPM | HEIGHT: 65 IN | DIASTOLIC BLOOD PRESSURE: 70 MMHG | WEIGHT: 138 LBS

## 2023-05-02 DIAGNOSIS — Z82.49 FAMILY HISTORY OF EARLY CAD: ICD-10-CM

## 2023-05-02 DIAGNOSIS — I35.1 NONRHEUMATIC AORTIC VALVE INSUFFICIENCY: Primary | ICD-10-CM

## 2023-05-02 DIAGNOSIS — I71.21 ANEURYSM OF ASCENDING AORTA WITHOUT RUPTURE (HCC): ICD-10-CM

## 2023-05-02 DIAGNOSIS — Z87.891 HISTORY OF TOBACCO USE: ICD-10-CM

## 2023-05-02 PROCEDURE — G0463 HOSPITAL OUTPT CLINIC VISIT: HCPCS | Performed by: INTERNAL MEDICINE

## 2023-09-08 ENCOUNTER — OFFICE VISIT (OUTPATIENT)
Age: 73
End: 2023-09-08
Payer: MEDICARE

## 2023-09-08 VITALS
BODY MASS INDEX: 23.12 KG/M2 | TEMPERATURE: 97.8 F | WEIGHT: 135.4 LBS | SYSTOLIC BLOOD PRESSURE: 113 MMHG | HEIGHT: 64 IN | DIASTOLIC BLOOD PRESSURE: 72 MMHG | OXYGEN SATURATION: 95 % | HEART RATE: 66 BPM | RESPIRATION RATE: 16 BRPM

## 2023-09-08 DIAGNOSIS — Z00.00 MEDICARE ANNUAL WELLNESS VISIT, SUBSEQUENT: Primary | ICD-10-CM

## 2023-09-08 DIAGNOSIS — M54.6 CHRONIC MIDLINE THORACIC BACK PAIN: ICD-10-CM

## 2023-09-08 DIAGNOSIS — G89.29 CHRONIC MIDLINE THORACIC BACK PAIN: ICD-10-CM

## 2023-09-08 DIAGNOSIS — D50.9 IRON DEFICIENCY ANEMIA, UNSPECIFIED IRON DEFICIENCY ANEMIA TYPE: ICD-10-CM

## 2023-09-08 DIAGNOSIS — R00.1 BRADYCARDIA: ICD-10-CM

## 2023-09-08 DIAGNOSIS — E78.2 MIXED HYPERLIPIDEMIA: ICD-10-CM

## 2023-09-08 DIAGNOSIS — F32.0 MAJOR DEPRESSIVE DISORDER, SINGLE EPISODE, MILD (HCC): ICD-10-CM

## 2023-09-08 PROCEDURE — 99213 OFFICE O/P EST LOW 20 MIN: CPT | Performed by: STUDENT IN AN ORGANIZED HEALTH CARE EDUCATION/TRAINING PROGRAM

## 2023-09-08 SDOH — ECONOMIC STABILITY: FOOD INSECURITY: WITHIN THE PAST 12 MONTHS, YOU WORRIED THAT YOUR FOOD WOULD RUN OUT BEFORE YOU GOT MONEY TO BUY MORE.: NEVER TRUE

## 2023-09-08 SDOH — ECONOMIC STABILITY: HOUSING INSECURITY
IN THE LAST 12 MONTHS, WAS THERE A TIME WHEN YOU DID NOT HAVE A STEADY PLACE TO SLEEP OR SLEPT IN A SHELTER (INCLUDING NOW)?: NO

## 2023-09-08 SDOH — ECONOMIC STABILITY: INCOME INSECURITY: HOW HARD IS IT FOR YOU TO PAY FOR THE VERY BASICS LIKE FOOD, HOUSING, MEDICAL CARE, AND HEATING?: NOT HARD AT ALL

## 2023-09-08 SDOH — ECONOMIC STABILITY: FOOD INSECURITY: WITHIN THE PAST 12 MONTHS, THE FOOD YOU BOUGHT JUST DIDN'T LAST AND YOU DIDN'T HAVE MONEY TO GET MORE.: NEVER TRUE

## 2023-09-08 ASSESSMENT — PATIENT HEALTH QUESTIONNAIRE - PHQ9
4. FEELING TIRED OR HAVING LITTLE ENERGY: 0
7. TROUBLE CONCENTRATING ON THINGS, SUCH AS READING THE NEWSPAPER OR WATCHING TELEVISION: 0
2. FEELING DOWN, DEPRESSED OR HOPELESS: 0
SUM OF ALL RESPONSES TO PHQ QUESTIONS 1-9: 0
SUM OF ALL RESPONSES TO PHQ QUESTIONS 1-9: 0
5. POOR APPETITE OR OVEREATING: 0
3. TROUBLE FALLING OR STAYING ASLEEP: 0
8. MOVING OR SPEAKING SO SLOWLY THAT OTHER PEOPLE COULD HAVE NOTICED. OR THE OPPOSITE, BEING SO FIGETY OR RESTLESS THAT YOU HAVE BEEN MOVING AROUND A LOT MORE THAN USUAL: 0
10. IF YOU CHECKED OFF ANY PROBLEMS, HOW DIFFICULT HAVE THESE PROBLEMS MADE IT FOR YOU TO DO YOUR WORK, TAKE CARE OF THINGS AT HOME, OR GET ALONG WITH OTHER PEOPLE: 0
6. FEELING BAD ABOUT YOURSELF - OR THAT YOU ARE A FAILURE OR HAVE LET YOURSELF OR YOUR FAMILY DOWN: 0
SUM OF ALL RESPONSES TO PHQ QUESTIONS 1-9: 0
SUM OF ALL RESPONSES TO PHQ9 QUESTIONS 1 & 2: 0
SUM OF ALL RESPONSES TO PHQ QUESTIONS 1-9: 0
9. THOUGHTS THAT YOU WOULD BE BETTER OFF DEAD, OR OF HURTING YOURSELF: 0
1. LITTLE INTEREST OR PLEASURE IN DOING THINGS: 0

## 2023-09-08 ASSESSMENT — LIFESTYLE VARIABLES
HOW MANY STANDARD DRINKS CONTAINING ALCOHOL DO YOU HAVE ON A TYPICAL DAY: 1 OR 2
HOW OFTEN DO YOU HAVE A DRINK CONTAINING ALCOHOL: 2-3 TIMES A WEEK

## 2023-09-08 NOTE — PROGRESS NOTES
Medicare Annual Wellness Visit    Audrey Bansal is here for Medicare AWV    Assessment & Plan   Medicare annual wellness visit, subsequent  Major depressive disorder, single episode, mild (HCC)  Assessment & Plan:  Controlled, continue celexa   Iron deficiency anemia, unspecified iron deficiency anemia type  Assessment & Plan:  She has been taking iron pill for several years - will update iron panel and CBC. If levels normal can stop supplement. If she continues to have CARSON then she needs EGD. She had colonoscopy done 4/2022 and it showed a sessile serrated adenoma. Orders:  -     Iron and TIBC; Future  -     Ferritin; Future  Mixed hyperlipidemia  Assessment & Plan:  Cont statin. Will update lipid panel to assess control. She is taking asa 80 for family history of early CAD. Orders:  -     CBC; Future  -     Comprehensive Metabolic Panel; Future  -     Lipid Panel; Future  Chronic midline thoracic back pain  Assessment & Plan:  We discussed her back pain at length. She has a midline thoracic back pain that is positiional and then some lumbar pain and stiffness to the right and left of midline. She exercises with weight and yoga multiple times a week and her pain doesn't limit her function. We talked about physical therapy but this would not contribute much on top of her exercise regimen. She asks about imaging but I do not think this is necessary as it would not change her management. Continue conservative management - stay active. Bradycardia  - nocturnal only. No issues during the day or with exercise. She has EKG from 10/2022 that shows sinus bradycardia - I reviewed this. No need for further eval. Reassurance provided. Recommendations for Preventive Services Due: see orders and patient instructions/AVS.  Recommended screening schedule for the next 5-10 years is provided to the patient in written form: see Patient Instructions/AVS.     Return in 6 months (on 3/8/2024) for follow up.      Subjective

## 2023-09-08 NOTE — PATIENT INSTRUCTIONS
Advance Directives: Care Instructions  Overview  An advance directive is a legal way to state your wishes at the end of your life. It tells your family and your doctor what to do if you can't say what you want. There are two main types of advance directives. You can change them any time your wishes change. Living will. This form tells your family and your doctor your wishes about life support and other treatment. The form is also called a declaration. Medical power of . This form lets you name a person to make treatment decisions for you when you can't speak for yourself. This person is called a health care agent (health care proxy, health care surrogate). The form is also called a durable power of  for health care. If you do not have an advance directive, decisions about your medical care may be made by a family member, or by a doctor or a  who doesn't know you. It may help to think of an advance directive as a gift to the people who care for you. If you have one, they won't have to make tough decisions by themselves. For more information, including forms for your state, see the [address] [place] [address] website (www.caringinfo.org/planning/advance-directives/). Follow-up care is a key part of your treatment and safety. Be sure to make and go to all appointments, and call your doctor if you are having problems. It's also a good idea to know your test results and keep a list of the medicines you take. What should you include in an advance directive? Many states have a unique advance directive form. (It may ask you to address specific issues.) Or you might use a universal form that's approved by many states. If your form doesn't tell you what to address, it may be hard to know what to include in your advance directive. Use the questions below to help you get started. Who do you want to make decisions about your medical care if you are not able to?   What life-support measures do you want if you

## 2023-09-10 PROBLEM — M54.6 CHRONIC MIDLINE THORACIC BACK PAIN: Status: ACTIVE | Noted: 2023-09-10

## 2023-09-10 PROBLEM — G89.29 CHRONIC MIDLINE THORACIC BACK PAIN: Status: ACTIVE | Noted: 2023-09-10

## 2023-09-10 PROBLEM — E78.2 MIXED HYPERLIPIDEMIA: Status: ACTIVE | Noted: 2023-09-10

## 2023-09-10 PROBLEM — F32.A MILD DEPRESSION: Status: RESOLVED | Noted: 2018-02-09 | Resolved: 2023-09-10

## 2023-09-19 DIAGNOSIS — E78.2 MIXED HYPERLIPIDEMIA: ICD-10-CM

## 2023-09-19 DIAGNOSIS — D50.9 IRON DEFICIENCY ANEMIA, UNSPECIFIED IRON DEFICIENCY ANEMIA TYPE: ICD-10-CM

## 2023-09-19 LAB
ALBUMIN SERPL-MCNC: 3.7 G/DL (ref 3.5–5)
ALBUMIN/GLOB SERPL: 1.5 (ref 1.1–2.2)
ALP SERPL-CCNC: 155 U/L (ref 45–117)
ALT SERPL-CCNC: 44 U/L (ref 12–78)
ANION GAP SERPL CALC-SCNC: 3 MMOL/L (ref 5–15)
AST SERPL-CCNC: 34 U/L (ref 15–37)
BILIRUB SERPL-MCNC: 0.5 MG/DL (ref 0.2–1)
BUN SERPL-MCNC: 19 MG/DL (ref 6–20)
BUN/CREAT SERPL: 24 (ref 12–20)
CALCIUM SERPL-MCNC: 9.1 MG/DL (ref 8.5–10.1)
CHLORIDE SERPL-SCNC: 108 MMOL/L (ref 97–108)
CHOLEST SERPL-MCNC: 111 MG/DL
CO2 SERPL-SCNC: 29 MMOL/L (ref 21–32)
CREAT SERPL-MCNC: 0.78 MG/DL (ref 0.55–1.02)
ERYTHROCYTE [DISTWIDTH] IN BLOOD BY AUTOMATED COUNT: 12 % (ref 11.5–14.5)
FERRITIN SERPL-MCNC: 20 NG/ML (ref 8–252)
GLOBULIN SER CALC-MCNC: 2.5 G/DL (ref 2–4)
GLUCOSE SERPL-MCNC: 87 MG/DL (ref 65–100)
HCT VFR BLD AUTO: 41 % (ref 35–47)
HDLC SERPL-MCNC: 53 MG/DL
HDLC SERPL: 2.1 (ref 0–5)
HGB BLD-MCNC: 13.7 G/DL (ref 11.5–16)
IRON SATN MFR SERPL: 20 % (ref 20–50)
IRON SERPL-MCNC: 70 UG/DL (ref 35–150)
LDLC SERPL CALC-MCNC: 47.4 MG/DL (ref 0–100)
MCH RBC QN AUTO: 29.7 PG (ref 26–34)
MCHC RBC AUTO-ENTMCNC: 33.4 G/DL (ref 30–36.5)
MCV RBC AUTO: 88.7 FL (ref 80–99)
NRBC # BLD: 0 K/UL (ref 0–0.01)
NRBC BLD-RTO: 0 PER 100 WBC
PLATELET # BLD AUTO: 175 K/UL (ref 150–400)
PMV BLD AUTO: 10.4 FL (ref 8.9–12.9)
POTASSIUM SERPL-SCNC: 4.5 MMOL/L (ref 3.5–5.1)
PROT SERPL-MCNC: 6.2 G/DL (ref 6.4–8.2)
RBC # BLD AUTO: 4.62 M/UL (ref 3.8–5.2)
SODIUM SERPL-SCNC: 140 MMOL/L (ref 136–145)
TIBC SERPL-MCNC: 350 UG/DL (ref 250–450)
TRIGL SERPL-MCNC: 53 MG/DL
VLDLC SERPL CALC-MCNC: 10.6 MG/DL
WBC # BLD AUTO: 4.7 K/UL (ref 3.6–11)

## 2023-09-21 NOTE — RESULT ENCOUNTER NOTE
Will send [facility]  - alk phos mildly elevated - may check isoenzymes if remains up. Not a know side effect of osteoporosis meds (evenity, prolia)  - iron levels normal. She can stop iron lab.      Sean Paul MD

## 2023-11-21 ENCOUNTER — PATIENT MESSAGE (OUTPATIENT)
Age: 73
End: 2023-11-21

## 2023-11-21 DIAGNOSIS — M79.601 RIGHT ARM PAIN: Primary | ICD-10-CM

## 2023-11-28 ENCOUNTER — OFFICE VISIT (OUTPATIENT)
Age: 73
End: 2023-11-28
Payer: MEDICARE

## 2023-11-28 ENCOUNTER — ANCILLARY PROCEDURE (OUTPATIENT)
Age: 73
End: 2023-11-28
Payer: MEDICARE

## 2023-11-28 VITALS — HEIGHT: 65 IN | WEIGHT: 135 LBS | BODY MASS INDEX: 22.49 KG/M2

## 2023-11-28 VITALS
HEART RATE: 78 BPM | OXYGEN SATURATION: 95 % | DIASTOLIC BLOOD PRESSURE: 56 MMHG | WEIGHT: 135 LBS | BODY MASS INDEX: 22.49 KG/M2 | HEIGHT: 65 IN | SYSTOLIC BLOOD PRESSURE: 108 MMHG

## 2023-11-28 DIAGNOSIS — I35.1 NONRHEUMATIC AORTIC (VALVE) INSUFFICIENCY: ICD-10-CM

## 2023-11-28 DIAGNOSIS — I71.21 ANEURYSM OF THE ASCENDING AORTA, WITHOUT RUPTURE (HCC): Primary | ICD-10-CM

## 2023-11-28 DIAGNOSIS — Z82.49 FAMILY HISTORY OF EARLY CAD: ICD-10-CM

## 2023-11-28 DIAGNOSIS — I35.1 AORTIC INSUFFICIENCY: ICD-10-CM

## 2023-11-28 DIAGNOSIS — E78.2 MIXED HYPERLIPIDEMIA: ICD-10-CM

## 2023-11-28 DIAGNOSIS — I77.810 ASCENDING AORTA DILATION (HCC): ICD-10-CM

## 2023-11-28 LAB
ECHO AO ASC DIAM: 4 CM
ECHO AO ASCENDING AORTA INDEX: 2.4 CM/M2
ECHO AO ROOT DIAM: 3.4 CM
ECHO AO ROOT INDEX: 2.04 CM/M2
ECHO AR MAX VEL PISA: 3.5 M/S
ECHO AV AREA PEAK VELOCITY: 1.7 CM2
ECHO AV AREA VTI: 1.9 CM2
ECHO AV AREA/BSA PEAK VELOCITY: 1 CM2/M2
ECHO AV AREA/BSA VTI: 1.1 CM2/M2
ECHO AV MEAN GRADIENT: 6 MMHG
ECHO AV MEAN VELOCITY: 1.2 M/S
ECHO AV PEAK GRADIENT: 13 MMHG
ECHO AV PEAK VELOCITY: 1.8 M/S
ECHO AV REGURGITANT PHT: 581.4 MILLISECOND
ECHO AV VELOCITY RATIO: 0.61
ECHO AV VTI: 35.1 CM
ECHO BSA: 1.68 M2
ECHO EST RA PRESSURE: 3 MMHG
ECHO LA DIAMETER INDEX: 2.1 CM/M2
ECHO LA DIAMETER: 3.5 CM
ECHO LA TO AORTIC ROOT RATIO: 1.03
ECHO LA VOL A-L A2C: 59 ML (ref 22–52)
ECHO LA VOL A-L A4C: 45 ML (ref 22–52)
ECHO LA VOL BP: 50 ML (ref 22–52)
ECHO LA VOL MOD A2C: 54 ML (ref 22–52)
ECHO LA VOL MOD A4C: 41 ML (ref 22–52)
ECHO LA VOL/BSA BIPLANE: 30 ML/M2 (ref 16–34)
ECHO LA VOLUME AREA LENGTH: 55 ML
ECHO LA VOLUME INDEX A-L A2C: 35 ML/M2 (ref 16–34)
ECHO LA VOLUME INDEX A-L A4C: 27 ML/M2 (ref 16–34)
ECHO LA VOLUME INDEX AREA LENGTH: 33 ML/M2 (ref 16–34)
ECHO LA VOLUME INDEX MOD A2C: 32 ML/M2 (ref 16–34)
ECHO LA VOLUME INDEX MOD A4C: 25 ML/M2 (ref 16–34)
ECHO LV E' LATERAL VELOCITY: 6 CM/S
ECHO LV E' SEPTAL VELOCITY: 5 CM/S
ECHO LV EDV A2C: 67 ML
ECHO LV EDV A4C: 73 ML
ECHO LV EDV BP: 72 ML (ref 56–104)
ECHO LV EDV INDEX A4C: 44 ML/M2
ECHO LV EDV INDEX BP: 43 ML/M2
ECHO LV EDV NDEX A2C: 40 ML/M2
ECHO LV EJECTION FRACTION A2C: 54 %
ECHO LV EJECTION FRACTION A4C: 43 %
ECHO LV EJECTION FRACTION BIPLANE: 50 % (ref 55–100)
ECHO LV ESV A2C: 31 ML
ECHO LV ESV A4C: 42 ML
ECHO LV ESV BP: 36 ML (ref 19–49)
ECHO LV ESV INDEX A2C: 19 ML/M2
ECHO LV ESV INDEX A4C: 25 ML/M2
ECHO LV ESV INDEX BP: 22 ML/M2
ECHO LV FRACTIONAL SHORTENING: 22 % (ref 28–44)
ECHO LV INTERNAL DIMENSION DIASTOLE INDEX: 2.22 CM/M2
ECHO LV INTERNAL DIMENSION DIASTOLIC: 3.7 CM (ref 3.9–5.3)
ECHO LV INTERNAL DIMENSION SYSTOLIC INDEX: 1.74 CM/M2
ECHO LV INTERNAL DIMENSION SYSTOLIC: 2.9 CM
ECHO LV IVSD: 1.2 CM (ref 0.6–0.9)
ECHO LV MASS 2D: 156.7 G (ref 67–162)
ECHO LV MASS INDEX 2D: 93.9 G/M2 (ref 43–95)
ECHO LV POSTERIOR WALL DIASTOLIC: 1.3 CM (ref 0.6–0.9)
ECHO LV RELATIVE WALL THICKNESS RATIO: 0.7
ECHO LVOT AREA: 2.8 CM2
ECHO LVOT AV VTI INDEX: 0.72
ECHO LVOT DIAM: 1.9 CM
ECHO LVOT MEAN GRADIENT: 3 MMHG
ECHO LVOT PEAK GRADIENT: 5 MMHG
ECHO LVOT PEAK VELOCITY: 1.1 M/S
ECHO LVOT STROKE VOLUME INDEX: 42.8 ML/M2
ECHO LVOT SV: 71.4 ML
ECHO LVOT VTI: 25.2 CM
ECHO MV A VELOCITY: 0.92 M/S
ECHO MV E DECELERATION TIME (DT): 353.8 MS
ECHO MV E VELOCITY: 0.75 M/S
ECHO MV E/A RATIO: 0.82
ECHO MV E/E' LATERAL: 12.5
ECHO MV E/E' RATIO (AVERAGED): 13.75
ECHO PV MAX VELOCITY: 0.8 M/S
ECHO PV PEAK GRADIENT: 3 MMHG
ECHO RIGHT VENTRICULAR SYSTOLIC PRESSURE (RVSP): 26 MMHG
ECHO RV TAPSE: 2.4 CM (ref 1.7–?)
ECHO TV REGURGITANT MAX VELOCITY: 2.4 M/S
ECHO TV REGURGITANT PEAK GRADIENT: 23 MMHG

## 2023-11-28 PROCEDURE — 93005 ELECTROCARDIOGRAM TRACING: CPT | Performed by: INTERNAL MEDICINE

## 2023-11-28 PROCEDURE — 99214 OFFICE O/P EST MOD 30 MIN: CPT | Performed by: INTERNAL MEDICINE

## 2023-11-28 PROCEDURE — 93306 TTE W/DOPPLER COMPLETE: CPT | Performed by: INTERNAL MEDICINE

## 2023-11-28 ASSESSMENT — PATIENT HEALTH QUESTIONNAIRE - PHQ9
SUM OF ALL RESPONSES TO PHQ QUESTIONS 1-9: 0
SUM OF ALL RESPONSES TO PHQ9 QUESTIONS 1 & 2: 0
1. LITTLE INTEREST OR PLEASURE IN DOING THINGS: 0
SUM OF ALL RESPONSES TO PHQ QUESTIONS 1-9: 0
2. FEELING DOWN, DEPRESSED OR HOPELESS: 0

## 2023-11-28 NOTE — PROGRESS NOTES
hours as needed    triamcinolone (NASACORT) 55 MCG/ACT nasal inhaler 2 sprays daily    ferrous sulfate (IRON 325) 325 (65 Fe) MG tablet Take by mouth Twice a Week (Patient not taking: Reported on 11/28/2023)     No current facility-administered medications for this visit.        Aimee Lindsey MD  [facility] [place]  [address] [place] [address] [address]  [facility], [address] [place] [address]

## 2024-03-11 RX ORDER — CITALOPRAM 40 MG/1
40 TABLET ORAL DAILY
Qty: 90 TABLET | Refills: 3 | Status: SHIPPED | OUTPATIENT
Start: 2024-03-11

## 2024-03-11 NOTE — TELEPHONE ENCOUNTER
Pt last seen on 9/8/23. Due to return in 6 months.    Has appt on 3/12/24.    Rx last filled on 3/12/23 #90/3RF.    Will forward to MD for refill.

## 2024-03-12 ENCOUNTER — OFFICE VISIT (OUTPATIENT)
Age: 74
End: 2024-03-12
Payer: MEDICARE

## 2024-03-12 VITALS
DIASTOLIC BLOOD PRESSURE: 79 MMHG | OXYGEN SATURATION: 98 % | BODY MASS INDEX: 23.22 KG/M2 | WEIGHT: 139.4 LBS | HEIGHT: 65 IN | TEMPERATURE: 97.7 F | HEART RATE: 63 BPM | RESPIRATION RATE: 20 BRPM | SYSTOLIC BLOOD PRESSURE: 133 MMHG

## 2024-03-12 DIAGNOSIS — M54.6 CHRONIC MIDLINE THORACIC BACK PAIN: Primary | ICD-10-CM

## 2024-03-12 DIAGNOSIS — G89.29 CHRONIC MIDLINE THORACIC BACK PAIN: Primary | ICD-10-CM

## 2024-03-12 DIAGNOSIS — F32.0 MAJOR DEPRESSIVE DISORDER, SINGLE EPISODE, MILD (HCC): ICD-10-CM

## 2024-03-12 PROCEDURE — 1036F TOBACCO NON-USER: CPT | Performed by: STUDENT IN AN ORGANIZED HEALTH CARE EDUCATION/TRAINING PROGRAM

## 2024-03-12 PROCEDURE — G8420 CALC BMI NORM PARAMETERS: HCPCS | Performed by: STUDENT IN AN ORGANIZED HEALTH CARE EDUCATION/TRAINING PROGRAM

## 2024-03-12 PROCEDURE — 99213 OFFICE O/P EST LOW 20 MIN: CPT | Performed by: STUDENT IN AN ORGANIZED HEALTH CARE EDUCATION/TRAINING PROGRAM

## 2024-03-12 PROCEDURE — 1090F PRES/ABSN URINE INCON ASSESS: CPT | Performed by: STUDENT IN AN ORGANIZED HEALTH CARE EDUCATION/TRAINING PROGRAM

## 2024-03-12 PROCEDURE — G8427 DOCREV CUR MEDS BY ELIG CLIN: HCPCS | Performed by: STUDENT IN AN ORGANIZED HEALTH CARE EDUCATION/TRAINING PROGRAM

## 2024-03-12 PROCEDURE — 3017F COLORECTAL CA SCREEN DOC REV: CPT | Performed by: STUDENT IN AN ORGANIZED HEALTH CARE EDUCATION/TRAINING PROGRAM

## 2024-03-12 PROCEDURE — 1123F ACP DISCUSS/DSCN MKR DOCD: CPT | Performed by: STUDENT IN AN ORGANIZED HEALTH CARE EDUCATION/TRAINING PROGRAM

## 2024-03-12 PROCEDURE — G8399 PT W/DXA RESULTS DOCUMENT: HCPCS | Performed by: STUDENT IN AN ORGANIZED HEALTH CARE EDUCATION/TRAINING PROGRAM

## 2024-03-12 PROCEDURE — G8484 FLU IMMUNIZE NO ADMIN: HCPCS | Performed by: STUDENT IN AN ORGANIZED HEALTH CARE EDUCATION/TRAINING PROGRAM

## 2024-03-12 ASSESSMENT — PATIENT HEALTH QUESTIONNAIRE - PHQ9
SUM OF ALL RESPONSES TO PHQ QUESTIONS 1-9: 0
1. LITTLE INTEREST OR PLEASURE IN DOING THINGS: 0
5. POOR APPETITE OR OVEREATING: 0
SUM OF ALL RESPONSES TO PHQ QUESTIONS 1-9: 0
8. MOVING OR SPEAKING SO SLOWLY THAT OTHER PEOPLE COULD HAVE NOTICED. OR THE OPPOSITE, BEING SO FIGETY OR RESTLESS THAT YOU HAVE BEEN MOVING AROUND A LOT MORE THAN USUAL: 0
SUM OF ALL RESPONSES TO PHQ QUESTIONS 1-9: 0
6. FEELING BAD ABOUT YOURSELF - OR THAT YOU ARE A FAILURE OR HAVE LET YOURSELF OR YOUR FAMILY DOWN: 0
SUM OF ALL RESPONSES TO PHQ9 QUESTIONS 1 & 2: 0
3. TROUBLE FALLING OR STAYING ASLEEP: 0
4. FEELING TIRED OR HAVING LITTLE ENERGY: 0
2. FEELING DOWN, DEPRESSED OR HOPELESS: 0
SUM OF ALL RESPONSES TO PHQ QUESTIONS 1-9: 0
9. THOUGHTS THAT YOU WOULD BE BETTER OFF DEAD, OR OF HURTING YOURSELF: 0
7. TROUBLE CONCENTRATING ON THINGS, SUCH AS READING THE NEWSPAPER OR WATCHING TELEVISION: 0
10. IF YOU CHECKED OFF ANY PROBLEMS, HOW DIFFICULT HAVE THESE PROBLEMS MADE IT FOR YOU TO DO YOUR WORK, TAKE CARE OF THINGS AT HOME, OR GET ALONG WITH OTHER PEOPLE: 0

## 2024-03-12 NOTE — PROGRESS NOTES
Neurological:      General: No focal deficit present.      Mental Status: She is alert.   Psychiatric:         Mood and Affect: Mood normal.         Behavior: Behavior normal.              Radha De Santiago MD

## 2024-03-12 NOTE — ASSESSMENT & PLAN NOTE
Thoracic and lumbar pain that is muscular in nature. She will continue to work with PT, plans to do dry needling. I strongly encouraged her to refrain from doing any back strength training and focus on stretching, ROM, cardio for the next 2 weeks to allow time for inflammation in the back to cool down. She can discuss more specifics with physical therapy.   We discussed utility of muscle relaxer, she doesn't want to try it. She doesn't like taking meds so really not even taking NSAIDS.

## 2024-05-29 ENCOUNTER — OFFICE VISIT (OUTPATIENT)
Age: 74
End: 2024-05-29
Payer: MEDICARE

## 2024-05-29 VITALS
BODY MASS INDEX: 22.46 KG/M2 | OXYGEN SATURATION: 97 % | SYSTOLIC BLOOD PRESSURE: 98 MMHG | DIASTOLIC BLOOD PRESSURE: 58 MMHG | HEIGHT: 65 IN | WEIGHT: 134.8 LBS | HEART RATE: 72 BPM

## 2024-05-29 DIAGNOSIS — I71.21 ANEURYSM OF THE ASCENDING AORTA, WITHOUT RUPTURE (HCC): ICD-10-CM

## 2024-05-29 DIAGNOSIS — E78.2 MIXED HYPERLIPIDEMIA: ICD-10-CM

## 2024-05-29 DIAGNOSIS — I35.1 NONRHEUMATIC AORTIC VALVE INSUFFICIENCY: Primary | ICD-10-CM

## 2024-05-29 DIAGNOSIS — Z82.49 FAMILY HISTORY OF EARLY CAD: ICD-10-CM

## 2024-05-29 PROCEDURE — 1036F TOBACCO NON-USER: CPT | Performed by: INTERNAL MEDICINE

## 2024-05-29 PROCEDURE — G8399 PT W/DXA RESULTS DOCUMENT: HCPCS | Performed by: INTERNAL MEDICINE

## 2024-05-29 PROCEDURE — 1090F PRES/ABSN URINE INCON ASSESS: CPT | Performed by: INTERNAL MEDICINE

## 2024-05-29 PROCEDURE — 99214 OFFICE O/P EST MOD 30 MIN: CPT | Performed by: INTERNAL MEDICINE

## 2024-05-29 PROCEDURE — 1123F ACP DISCUSS/DSCN MKR DOCD: CPT | Performed by: INTERNAL MEDICINE

## 2024-05-29 PROCEDURE — 3017F COLORECTAL CA SCREEN DOC REV: CPT | Performed by: INTERNAL MEDICINE

## 2024-05-29 PROCEDURE — G8427 DOCREV CUR MEDS BY ELIG CLIN: HCPCS | Performed by: INTERNAL MEDICINE

## 2024-05-29 PROCEDURE — G8420 CALC BMI NORM PARAMETERS: HCPCS | Performed by: INTERNAL MEDICINE

## 2024-05-29 ASSESSMENT — PATIENT HEALTH QUESTIONNAIRE - PHQ9
1. LITTLE INTEREST OR PLEASURE IN DOING THINGS: NOT AT ALL
SUM OF ALL RESPONSES TO PHQ QUESTIONS 1-9: 0
2. FEELING DOWN, DEPRESSED OR HOPELESS: NOT AT ALL
SUM OF ALL RESPONSES TO PHQ QUESTIONS 1-9: 0
SUM OF ALL RESPONSES TO PHQ9 QUESTIONS 1 & 2: 0
SUM OF ALL RESPONSES TO PHQ QUESTIONS 1-9: 0
SUM OF ALL RESPONSES TO PHQ QUESTIONS 1-9: 0

## 2024-05-29 NOTE — PROGRESS NOTES
[facility] [place]:   [phone]    History of Present Illness:   Ms. Mccullough is a 75 yo F with history of tobacco use, family history of early CAD, iron deficiency anemia. CT scan of the chest on 08/06/2020 that noted no suspicious pulmonary nodules, but did note a calcified granuloma in the right upper lobe.  9/2020 echo demonstrates LV function was normal, mild to moderate regurgitation of the aortic valve, the aorta was mildly dilated at 3.8 cm (4.1 - 4.2 cm by CT scan). 9/11/20 stress echo was normal.     Since last visit she continues to do well.  Denies exertional chest pain. Breathing has been stable. Just rare palpitations. She is staying active on the elliptical 3-4 miles several times a week.  She does see her primary care as she has issues with DJD/arthritis. She is compensated on exam with clear lungs and I-II/VI diastolic murmur at the left sternal border, unchanged.  Her most recent echocardiogram in November 2022 demonstrated normal EF of 60% with mild aortic and mitral regurgitation, mildly dilated ascending aorta at 4 cm.      Assessment/Plan:  1. Aortic regurgitation.  This has been in the mild to moderate range.  Consider repeat echo in 6-12 months.  She is moving to [place], where her brother resides.  Recommended she establish with a cardiologist there.  It sounds like she is moving in July.  She can follow here on an as needed basis.  2. Ascending aortic aneurysm.  Has been in the 4 cm range by echo, 4.1 to 4.2 cm by CT scan.  Recommend serial echocardiograms to reduce radiation exposure and CT scans if there is a major change.  3. Mixed hyperlipidemia.  Tolerating statin.  4. History of tobacco use. Quit many years ago.  5. Family history of early CAD.      She  has a past medical history of Iron deficiency anemia, unspecified.    All other systems negative except as above.     PE  Vitals:    05/29/24 1352   BP: (!) 98/58   Site: Left Upper Arm   Position: Sitting   Cuff Size:

## 2024-06-14 RX ORDER — ROSUVASTATIN CALCIUM 20 MG/1
20 TABLET, COATED ORAL NIGHTLY
Qty: 90 TABLET | Refills: 3 | Status: SHIPPED | OUTPATIENT
Start: 2024-06-14

## 2024-06-14 NOTE — TELEPHONE ENCOUNTER
Per VO of MD    LOV: 5/29/2024     Future Appointments   Date Time Provider Department Center   9/12/2024 11:00 AM Radha De Santiago MD [facility]       Requested Prescriptions     Signed Prescriptions Disp Refills    rosuvastatin (CRESTOR) 20 MG tablet 90 tablet 3     Sig: TAKE ONE TABLET BY MOUTH ONCE NIGHTLY     Authorizing Provider: ADITI MCCONNELL     Ordering User: SYLVIA LLANES

## 2024-09-12 ENCOUNTER — TELEPHONE (OUTPATIENT)
Age: 74
End: 2024-09-12

## 2024-09-12 ENCOUNTER — PATIENT MESSAGE (OUTPATIENT)
Age: 74
End: 2024-09-12

## 2024-09-12 DIAGNOSIS — F32.0 MAJOR DEPRESSIVE DISORDER, SINGLE EPISODE, MILD (HCC): Primary | ICD-10-CM

## 2024-09-12 RX ORDER — CITALOPRAM HYDROBROMIDE 40 MG/1
40 TABLET ORAL DAILY
Qty: 90 TABLET | Refills: 0 | Status: SHIPPED | OUTPATIENT
Start: 2024-09-12